# Patient Record
Sex: FEMALE | Race: WHITE | Employment: UNEMPLOYED | URBAN - METROPOLITAN AREA
[De-identification: names, ages, dates, MRNs, and addresses within clinical notes are randomized per-mention and may not be internally consistent; named-entity substitution may affect disease eponyms.]

---

## 2017-03-05 ENCOUNTER — APPOINTMENT (OUTPATIENT)
Dept: GENERAL RADIOLOGY | Age: 22
End: 2017-03-05
Payer: COMMERCIAL

## 2017-03-05 ENCOUNTER — HOSPITAL ENCOUNTER (EMERGENCY)
Age: 22
Discharge: HOME | End: 2017-03-05
Attending: FAMILY MEDICINE
Payer: COMMERCIAL

## 2017-03-05 VITALS
HEIGHT: 64 IN | DIASTOLIC BLOOD PRESSURE: 70 MMHG | BODY MASS INDEX: 18.78 KG/M2 | SYSTOLIC BLOOD PRESSURE: 131 MMHG | OXYGEN SATURATION: 100 % | RESPIRATION RATE: 16 BRPM | TEMPERATURE: 97.9 F | WEIGHT: 110 LBS | HEART RATE: 95 BPM

## 2017-03-05 DIAGNOSIS — M54.2 NECK PAIN, CHRONIC: Primary | ICD-10-CM

## 2017-03-05 DIAGNOSIS — G89.29 NECK PAIN, CHRONIC: Primary | ICD-10-CM

## 2017-03-05 LAB — PREGNANCY TEST URINE, POC: NORMAL

## 2017-03-05 PROCEDURE — 6360000002 HC RX W HCPCS: Performed by: FAMILY MEDICINE

## 2017-03-05 PROCEDURE — 96372 THER/PROPH/DIAG INJ SC/IM: CPT

## 2017-03-05 PROCEDURE — 72050 X-RAY EXAM NECK SPINE 4/5VWS: CPT

## 2017-03-05 PROCEDURE — 99283 EMERGENCY DEPT VISIT LOW MDM: CPT

## 2017-03-05 PROCEDURE — 6370000000 HC RX 637 (ALT 250 FOR IP): Performed by: FAMILY MEDICINE

## 2017-03-05 RX ORDER — HYDROCODONE BITARTRATE AND ACETAMINOPHEN 5; 325 MG/1; MG/1
1-2 TABLET ORAL EVERY 6 HOURS PRN
Qty: 20 TABLET | Refills: 0 | Status: SHIPPED | OUTPATIENT
Start: 2017-03-05 | End: 2017-03-12

## 2017-03-05 RX ORDER — KETOROLAC TROMETHAMINE 30 MG/ML
60 INJECTION, SOLUTION INTRAMUSCULAR; INTRAVENOUS ONCE
Status: COMPLETED | OUTPATIENT
Start: 2017-03-05 | End: 2017-03-05

## 2017-03-05 RX ORDER — DIAZEPAM 5 MG/ML
5 INJECTION, SOLUTION INTRAMUSCULAR; INTRAVENOUS ONCE
Status: DISCONTINUED | OUTPATIENT
Start: 2017-03-05 | End: 2017-03-05

## 2017-03-05 RX ORDER — HYDROCODONE BITARTRATE AND ACETAMINOPHEN 5; 325 MG/1; MG/1
1 TABLET ORAL ONCE
Status: COMPLETED | OUTPATIENT
Start: 2017-03-05 | End: 2017-03-05

## 2017-03-05 RX ORDER — ORPHENADRINE CITRATE 30 MG/ML
60 INJECTION INTRAMUSCULAR; INTRAVENOUS ONCE
Status: COMPLETED | OUTPATIENT
Start: 2017-03-05 | End: 2017-03-05

## 2017-03-05 RX ORDER — DIAZEPAM 5 MG/1
5 TABLET ORAL ONCE
Status: COMPLETED | OUTPATIENT
Start: 2017-03-05 | End: 2017-03-05

## 2017-03-05 RX ADMIN — DIAZEPAM 5 MG: 5 TABLET ORAL at 20:40

## 2017-03-05 RX ADMIN — ORPHENADRINE CITRATE 60 MG: 30 INJECTION INTRAMUSCULAR; INTRAVENOUS at 18:38

## 2017-03-05 RX ADMIN — KETOROLAC TROMETHAMINE 60 MG: 60 INJECTION, SOLUTION INTRAMUSCULAR at 18:39

## 2017-03-05 RX ADMIN — HYDROCODONE BITARTRATE AND ACETAMINOPHEN 1 TABLET: 5; 325 TABLET ORAL at 20:31

## 2017-03-05 ASSESSMENT — PAIN SCALES - GENERAL
PAINLEVEL_OUTOF10: 9

## 2017-03-05 ASSESSMENT — PAIN DESCRIPTION - PAIN TYPE: TYPE: ACUTE PAIN

## 2017-03-05 ASSESSMENT — PAIN DESCRIPTION - FREQUENCY: FREQUENCY: CONTINUOUS

## 2017-03-05 ASSESSMENT — PAIN DESCRIPTION - DESCRIPTORS: DESCRIPTORS: SHARP;ACHING

## 2017-03-05 ASSESSMENT — ENCOUNTER SYMPTOMS
PHOTOPHOBIA: 0
BOWEL INCONTINENCE: 0
VISUAL CHANGE: 0

## 2017-03-05 ASSESSMENT — PAIN DESCRIPTION - PROGRESSION: CLINICAL_PROGRESSION: GRADUALLY WORSENING

## 2017-03-05 ASSESSMENT — PAIN DESCRIPTION - LOCATION: LOCATION: BACK;NECK

## 2017-03-05 ASSESSMENT — PAIN DESCRIPTION - ONSET: ONSET: ON-GOING

## 2017-11-10 ENCOUNTER — HOSPITAL ENCOUNTER (EMERGENCY)
Age: 22
Discharge: HOME | End: 2017-11-10
Attending: EMERGENCY MEDICINE
Payer: COMMERCIAL

## 2017-11-10 VITALS
SYSTOLIC BLOOD PRESSURE: 111 MMHG | HEIGHT: 64 IN | DIASTOLIC BLOOD PRESSURE: 56 MMHG | OXYGEN SATURATION: 100 % | BODY MASS INDEX: 19.12 KG/M2 | TEMPERATURE: 98.1 F | HEART RATE: 75 BPM | RESPIRATION RATE: 14 BRPM | WEIGHT: 112 LBS

## 2017-11-10 DIAGNOSIS — K62.5 RECTAL BLEED: Primary | ICD-10-CM

## 2017-11-10 DIAGNOSIS — R10.9 CHRONIC ABDOMINAL PAIN: ICD-10-CM

## 2017-11-10 DIAGNOSIS — G89.29 CHRONIC ABDOMINAL PAIN: ICD-10-CM

## 2017-11-10 DIAGNOSIS — K60.2 RECTAL FISSURE: ICD-10-CM

## 2017-11-10 PROCEDURE — 99283 EMERGENCY DEPT VISIT LOW MDM: CPT

## 2017-11-10 ASSESSMENT — ENCOUNTER SYMPTOMS
FACIAL SWELLING: 0
CHEST TIGHTNESS: 0
DIARRHEA: 0
WHEEZING: 0
ABDOMINAL PAIN: 1
VOICE CHANGE: 0
CONSTIPATION: 0
SHORTNESS OF BREATH: 0
SINUS PRESSURE: 0
EYE DISCHARGE: 0
VOMITING: 0
SORE THROAT: 0
EYE PAIN: 0
EYE REDNESS: 0
CHOKING: 0
TROUBLE SWALLOWING: 0
ANAL BLEEDING: 1
STRIDOR: 0
COUGH: 0
RECTAL PAIN: 1
BACK PAIN: 0
BLOOD IN STOOL: 0

## 2017-11-10 ASSESSMENT — PAIN DESCRIPTION - ONSET: ONSET: ON-GOING

## 2017-11-10 ASSESSMENT — PAIN DESCRIPTION - FREQUENCY: FREQUENCY: INTERMITTENT

## 2017-11-10 ASSESSMENT — PAIN SCALES - GENERAL
PAINLEVEL_OUTOF10: 5
PAINLEVEL_OUTOF10: 3

## 2017-11-10 NOTE — ED TRIAGE NOTES
Patient in with rectal bleeding and low abdominal pain that she states has been ongoing for years she states she is afraid to go to the doctor to get a colonoscopy that was recommenced. Doctor did a rectal exam and there was no bleeding at this time.  vitals WNLS

## 2017-11-10 NOTE — ED PROVIDER NOTES
[address] [place] [address] ED  eMERGENCY dEPARTMENT eNCOUnter      Pt Name: Lilo Soliz  MRN: [id number]  [place] 1995  Date of evaluation: 11/10/2017  Provider: Javi Alexandra MD    [address] [place] [address]       Chief Complaint   Patient presents with    Rectal Bleeding     rectal bleeding x3 days. HISTORY OF PRESENT ILLNESS   (Location/Symptom, Timing/Onset, Context/Setting, Quality, Duration, Modifying Factors, Severity)  Note limiting factors. Lilo Soliz is a 23 y.o. female who presents to the emergency department Rectal pain and rectal bleeding bright red in color along with bowel movement, patient also complaining of left sided abdominal pain for years she was supposed to go for colonoscopy but at that time she was pregnant and that was 2 years ago but has not contacted any doctor for further investigation as per patient she is afraid to see doctors, patient comes to this emergency for 34 days of bright rectal bleeding along with all movement and becoming painful to rectally. There is no swelling did no hemorrhoid nor drainage no fever no chills.  1 para 1    HPI    Nursing Notes were reviewed. REVIEW OF SYSTEMS    (2-9 systems for level 4, 10 or more for level 5)     Review of Systems   Constitutional: Negative. Negative for activity change and fever. HENT: Negative for congestion, drooling, facial swelling, mouth sores, nosebleeds, sinus pressure, sore throat, trouble swallowing and voice change. Eyes: Negative for pain, discharge, redness and visual disturbance. Respiratory: Negative for cough, choking, chest tightness, shortness of breath, wheezing and stridor. Cardiovascular: Negative for chest pain, palpitations and leg swelling. Gastrointestinal: Positive for abdominal pain, anal bleeding and rectal pain. Negative for blood in stool, constipation, diarrhea and vomiting. Endocrine: Negative for cold intolerance, polyphagia and polyuria.    Genitourinary: Negative for dysuria, flank pain, frequency, genital sores and urgency. Musculoskeletal: Negative for back pain, joint swelling, neck pain and neck stiffness. Skin: Negative for pallor and rash. Neurological: Negative for tremors, seizures, syncope, weakness, numbness and headaches. Hematological: Negative for adenopathy. Does not bruise/bleed easily. Psychiatric/Behavioral: Negative for agitation, behavioral problems, hallucinations and sleep disturbance. The patient is not hyperactive. All other systems reviewed and are negative. Except as noted above the remainder of the review of systems was reviewed and negative. PAST MEDICAL HISTORY   History reviewed. No pertinent past medical history.       SURGICAL HISTORY       Past Surgical History:   Procedure Laterality Date    DOPPLER ECHOCARDIOGRAPHY  2013    TYMPANOSTOMY TUBE PLACEMENT           CURRENT MEDICATIONS       Previous Medications    FERROUS SULFATE 325 (65 FE) MG TABLET        LEVONORGESTREL (MIRENA) 20 MCG/24HR IUD    1 each by Intrauterine route once for 1 dose    SERTRALINE (ZOLOFT) 50 MG TABLET    Take 1 tablet by mouth daily       ALLERGIES     Amoxicillin    FAMILY HISTORY       Family History   Problem Relation Age of Onset    Cancer Maternal Grandfather      leukemia    Breast Cancer Neg Hx     Colon Cancer Neg Hx     Diabetes Neg Hx     Eclampsia Neg Hx     Hypertension Neg Hx     Ovarian Cancer Neg Hx      Labor Neg Hx     Spont Abortions Neg Hx     Stroke Neg Hx           SOCIAL HISTORY       Social History     Social History    Marital status: Single     Spouse name: N/A    Number of children: N/A    Years of education: N/A     Social History Main Topics    Smoking status: Never Smoker    Smokeless tobacco: Never Used    Alcohol use No    Drug use: No    Sexual activity: Yes     Partners: Male     Other Topics Concern    None     Social History Narrative    None       SCREENINGS             PHYSICAL normal range or not returned as of this dictation. EMERGENCY DEPARTMENT COURSE and DIFFERENTIAL DIAGNOSIS/MDM:   Vitals:    Vitals:    11/10/17 1720   BP: (!) 109/55   Pulse: 87   Resp: 14   Temp: 98.1 °F (36.7 °C)   TempSrc: Oral   SpO2: 98%   Weight: 112 lb (50.8 kg)   Height: 5' 4\" (1.626 m)           MDM    CRITICAL CARE TIME   Total Critical Care time was  minutes, excluding separately reportable procedures. There was a high probability of clinically significant/life threatening deterioration in the patient's condition which required my urgent intervention. ULTS:  None    PROCEDURES:  Unless otherwise noted below, none     Procedures    FINAL IMPRESSION      1. Rectal bleed    2. Rectal fissure    3.  Chronic abdominal pain          DISPOSITION/PLAN   DISPOSITION Decision to Discharge    PATIENT REFERRED TO:  Eder Bruno MD  [address] [place] [address]  [address]  [place]     In 1 week        DISCHARGE MEDICATIONS:  New Prescriptions    No medications on file          (Please note that portions of this note were completed with a voice recognition program.  Efforts were made to edit the dictations but occasionally words are mis-transcribed.)    Harry Shore MD (electronically signed)  Attending Emergency Physician       Harry Shore MD  11/10/17 [id number]

## 2018-10-26 ENCOUNTER — APPOINTMENT (OUTPATIENT)
Dept: GENERAL RADIOLOGY | Age: 23
End: 2018-10-26
Payer: COMMERCIAL

## 2018-10-26 ENCOUNTER — HOSPITAL ENCOUNTER (EMERGENCY)
Age: 23
Discharge: HOME | End: 2018-10-26
Payer: COMMERCIAL

## 2018-10-26 VITALS
WEIGHT: 120 LBS | HEART RATE: 71 BPM | OXYGEN SATURATION: 100 % | DIASTOLIC BLOOD PRESSURE: 77 MMHG | TEMPERATURE: 97.7 F | HEIGHT: 64 IN | SYSTOLIC BLOOD PRESSURE: 114 MMHG | RESPIRATION RATE: 14 BRPM | BODY MASS INDEX: 20.49 KG/M2

## 2018-10-26 DIAGNOSIS — E86.0 DEHYDRATION: Primary | ICD-10-CM

## 2018-10-26 DIAGNOSIS — R11.2 NON-INTRACTABLE VOMITING WITH NAUSEA, UNSPECIFIED VOMITING TYPE: ICD-10-CM

## 2018-10-26 DIAGNOSIS — R51.9 ACUTE NONINTRACTABLE HEADACHE, UNSPECIFIED HEADACHE TYPE: ICD-10-CM

## 2018-10-26 LAB
ALBUMIN SERPL-MCNC: 4.7 G/DL (ref 3.9–4.9)
ALP BLD-CCNC: 54 U/L (ref 40–130)
ALT SERPL-CCNC: 10 U/L (ref 0–33)
ANION GAP SERPL CALCULATED.3IONS-SCNC: 12 MEQ/L (ref 7–13)
AST SERPL-CCNC: 18 U/L (ref 0–35)
BACTERIA: NORMAL /HPF
BASOPHILS ABSOLUTE: 0.1 K/UL (ref 0–0.2)
BASOPHILS RELATIVE PERCENT: 0.6 %
BILIRUB SERPL-MCNC: 0.4 MG/DL (ref 0–1.2)
BILIRUBIN URINE: NEGATIVE
BLOOD, URINE: ABNORMAL
BUN BLDV-MCNC: 10 MG/DL (ref 6–20)
CALCIUM SERPL-MCNC: 9.6 MG/DL (ref 8.6–10.2)
CHLORIDE BLD-SCNC: 100 MEQ/L (ref 98–107)
CHP ED QC CHECK: YES
CLARITY: CLEAR
CO2: 27 MEQ/L (ref 22–29)
COLOR: YELLOW
CREAT SERPL-MCNC: 0.7 MG/DL (ref 0.5–0.9)
EOSINOPHILS ABSOLUTE: 0.1 K/UL (ref 0–0.7)
EOSINOPHILS RELATIVE PERCENT: 0.4 %
EPITHELIAL CELLS, UA: NORMAL /HPF
GFR AFRICAN AMERICAN: >60
GFR NON-AFRICAN AMERICAN: >60
GLOBULIN: 2.9 G/DL (ref 2.3–3.5)
GLUCOSE BLD-MCNC: 105 MG/DL (ref 74–109)
GLUCOSE URINE: NEGATIVE MG/DL
HCT VFR BLD CALC: 42.1 % (ref 37–47)
HEMOGLOBIN: 14.7 G/DL (ref 12–16)
KETONES, URINE: NEGATIVE MG/DL
LEUKOCYTE ESTERASE, URINE: NEGATIVE
LYMPHOCYTES ABSOLUTE: 2.3 K/UL (ref 1–4.8)
LYMPHOCYTES RELATIVE PERCENT: 17.1 %
MCH RBC QN AUTO: 31.8 PG (ref 27–31.3)
MCHC RBC AUTO-ENTMCNC: 34.8 % (ref 33–37)
MCV RBC AUTO: 91.5 FL (ref 82–100)
MONOCYTES ABSOLUTE: 0.7 K/UL (ref 0.2–0.8)
MONOCYTES RELATIVE PERCENT: 5.7 %
NEUTROPHILS ABSOLUTE: 10 K/UL (ref 1.4–6.5)
NEUTROPHILS RELATIVE PERCENT: 76.2 %
NITRITE, URINE: NEGATIVE
PDW BLD-RTO: 14.1 % (ref 11.5–14.5)
PH UA: 7 (ref 5–9)
PLATELET # BLD: 340 K/UL (ref 130–400)
POTASSIUM SERPL-SCNC: 3.9 MEQ/L (ref 3.5–5.1)
PREGNANCY TEST URINE, POC: NEGATIVE
PROTEIN UA: NEGATIVE MG/DL
RAPID INFLUENZA  B AGN: NEGATIVE
RAPID INFLUENZA A AGN: NEGATIVE
RBC # BLD: 4.6 M/UL (ref 4.2–5.4)
RBC UA: NORMAL /HPF (ref 0–2)
SODIUM BLD-SCNC: 139 MEQ/L (ref 132–144)
SPECIFIC GRAVITY UA: 1.02 (ref 1–1.03)
TOTAL PROTEIN: 7.6 G/DL (ref 6.4–8.1)
URINE REFLEX TO CULTURE: YES
UROBILINOGEN, URINE: 1 E.U./DL
WBC # BLD: 13.2 K/UL (ref 4.8–10.8)
WBC UA: NORMAL /HPF (ref 0–5)

## 2018-10-26 PROCEDURE — 2580000003 HC RX 258: Performed by: PHYSICIAN ASSISTANT

## 2018-10-26 PROCEDURE — 85025 COMPLETE CBC W/AUTO DIFF WBC: CPT

## 2018-10-26 PROCEDURE — 87804 INFLUENZA ASSAY W/OPTIC: CPT

## 2018-10-26 PROCEDURE — 71046 X-RAY EXAM CHEST 2 VIEWS: CPT

## 2018-10-26 PROCEDURE — 80053 COMPREHEN METABOLIC PANEL: CPT

## 2018-10-26 PROCEDURE — 6360000002 HC RX W HCPCS: Performed by: PHYSICIAN ASSISTANT

## 2018-10-26 PROCEDURE — 87086 URINE CULTURE/COLONY COUNT: CPT

## 2018-10-26 PROCEDURE — 96374 THER/PROPH/DIAG INJ IV PUSH: CPT

## 2018-10-26 PROCEDURE — 36415 COLL VENOUS BLD VENIPUNCTURE: CPT

## 2018-10-26 PROCEDURE — 99284 EMERGENCY DEPT VISIT MOD MDM: CPT

## 2018-10-26 PROCEDURE — 6370000000 HC RX 637 (ALT 250 FOR IP): Performed by: PHYSICIAN ASSISTANT

## 2018-10-26 PROCEDURE — 96375 TX/PRO/DX INJ NEW DRUG ADDON: CPT

## 2018-10-26 PROCEDURE — 81001 URINALYSIS AUTO W/SCOPE: CPT

## 2018-10-26 PROCEDURE — 96361 HYDRATE IV INFUSION ADD-ON: CPT

## 2018-10-26 RX ORDER — SULFAMETHOXAZOLE AND TRIMETHOPRIM 800; 160 MG/1; MG/1
1 TABLET ORAL ONCE
Status: COMPLETED | OUTPATIENT
Start: 2018-10-26 | End: 2018-10-26

## 2018-10-26 RX ORDER — ONDANSETRON 2 MG/ML
4 INJECTION INTRAMUSCULAR; INTRAVENOUS ONCE
Status: COMPLETED | OUTPATIENT
Start: 2018-10-26 | End: 2018-10-26

## 2018-10-26 RX ORDER — ONDANSETRON 4 MG/1
4 TABLET, ORALLY DISINTEGRATING ORAL EVERY 8 HOURS PRN
Qty: 20 TABLET | Refills: 0 | Status: SHIPPED | OUTPATIENT
Start: 2018-10-26 | End: 2019-02-12 | Stop reason: SDUPTHER

## 2018-10-26 RX ORDER — KETOROLAC TROMETHAMINE 30 MG/ML
30 INJECTION, SOLUTION INTRAMUSCULAR; INTRAVENOUS ONCE
Status: COMPLETED | OUTPATIENT
Start: 2018-10-26 | End: 2018-10-26

## 2018-10-26 RX ORDER — BUTALBITAL, ACETAMINOPHEN AND CAFFEINE 300; 40; 50 MG/1; MG/1; MG/1
1 CAPSULE ORAL EVERY 4 HOURS PRN
Qty: 20 CAPSULE | Refills: 0 | Status: SHIPPED | OUTPATIENT
Start: 2018-10-26 | End: 2019-02-12

## 2018-10-26 RX ORDER — 0.9 % SODIUM CHLORIDE 0.9 %
1000 INTRAVENOUS SOLUTION INTRAVENOUS ONCE
Status: COMPLETED | OUTPATIENT
Start: 2018-10-26 | End: 2018-10-26

## 2018-10-26 RX ORDER — HYDROXYZINE PAMOATE 25 MG/1
50 CAPSULE ORAL ONCE
Status: COMPLETED | OUTPATIENT
Start: 2018-10-26 | End: 2018-10-26

## 2018-10-26 RX ORDER — METOCLOPRAMIDE HYDROCHLORIDE 5 MG/ML
10 INJECTION INTRAMUSCULAR; INTRAVENOUS ONCE
Status: COMPLETED | OUTPATIENT
Start: 2018-10-26 | End: 2018-10-26

## 2018-10-26 RX ORDER — MORPHINE SULFATE 4 MG/ML
1 INJECTION, SOLUTION INTRAMUSCULAR; INTRAVENOUS ONCE
Status: COMPLETED | OUTPATIENT
Start: 2018-10-26 | End: 2018-10-26

## 2018-10-26 RX ORDER — SULFAMETHOXAZOLE AND TRIMETHOPRIM 800; 160 MG/1; MG/1
1 TABLET ORAL 2 TIMES DAILY
Qty: 20 TABLET | Refills: 0 | Status: SHIPPED | OUTPATIENT
Start: 2018-10-26 | End: 2018-11-05

## 2018-10-26 RX ADMIN — HYDROXYZINE PAMOATE 50 MG: 25 CAPSULE ORAL at 16:26

## 2018-10-26 RX ADMIN — METOCLOPRAMIDE 10 MG: 5 INJECTION, SOLUTION INTRAMUSCULAR; INTRAVENOUS at 16:26

## 2018-10-26 RX ADMIN — MORPHINE SULFATE 1 MG: 4 INJECTION, SOLUTION INTRAMUSCULAR; INTRAVENOUS at 17:13

## 2018-10-26 RX ADMIN — SULFAMETHOXAZOLE AND TRIMETHOPRIM 1 TABLET: 800; 160 TABLET ORAL at 16:26

## 2018-10-26 RX ADMIN — SODIUM CHLORIDE 1000 ML: 9 INJECTION, SOLUTION INTRAVENOUS at 16:25

## 2018-10-26 RX ADMIN — SODIUM CHLORIDE 1000 ML: 9 INJECTION, SOLUTION INTRAVENOUS at 15:00

## 2018-10-26 RX ADMIN — ONDANSETRON HYDROCHLORIDE 4 MG: 2 SOLUTION INTRAMUSCULAR; INTRAVENOUS at 15:00

## 2018-10-26 RX ADMIN — KETOROLAC TROMETHAMINE 30 MG: 30 INJECTION, SOLUTION INTRAMUSCULAR at 15:01

## 2018-10-26 ASSESSMENT — ENCOUNTER SYMPTOMS
COLOR CHANGE: 0
NAUSEA: 1
VOMITING: 1
EYE PAIN: 0
ALLERGIC/IMMUNOLOGIC NEGATIVE: 1
SINUS PAIN: 0
TROUBLE SWALLOWING: 0
APNEA: 0
ABDOMINAL PAIN: 0
SHORTNESS OF BREATH: 0
COUGH: 0

## 2018-10-26 ASSESSMENT — PAIN DESCRIPTION - LOCATION
LOCATION: HEAD

## 2018-10-26 ASSESSMENT — PAIN DESCRIPTION - DESCRIPTORS
DESCRIPTORS: ACHING
DESCRIPTORS: POUNDING

## 2018-10-26 ASSESSMENT — PAIN DESCRIPTION - PAIN TYPE
TYPE: ACUTE PAIN
TYPE: ACUTE PAIN

## 2018-10-26 ASSESSMENT — PAIN SCALES - GENERAL
PAINLEVEL_OUTOF10: 10
PAINLEVEL_OUTOF10: 9
PAINLEVEL_OUTOF10: 10
PAINLEVEL_OUTOF10: 9
PAINLEVEL_OUTOF10: 9

## 2018-10-26 ASSESSMENT — PAIN DESCRIPTION - ONSET: ONSET: ON-GOING

## 2018-10-26 ASSESSMENT — PAIN DESCRIPTION - FREQUENCY: FREQUENCY: CONTINUOUS

## 2018-10-26 ASSESSMENT — PAIN DESCRIPTION - ORIENTATION: ORIENTATION: UPPER

## 2018-10-26 NOTE — ED NOTES
D/C instructions and rx's x 2 given. Pt and boyfriend verbalized understanding. Boyfriend states he is driving her home. Pt laughing and joking at times. Denies any change in pain. Ambulated out with him with steady gait.      Lila Carter RN  10/26/18 1216 [facility], RN  10/26/18 6421

## 2018-10-26 NOTE — ED NOTES
Had to wake patient up to medicate. States very slight change in headache. States pain may be a 9/10 at this time. Pt appears very comfortable. Visitor still asleep in bed with patient. Woke him up. Informed they are going home. He states he is driving patient home.      Lori Bill RN  10/26/18 7611

## 2018-10-28 LAB — URINE CULTURE, ROUTINE: NORMAL

## 2019-02-12 ENCOUNTER — OFFICE VISIT (OUTPATIENT)
Dept: OBGYN CLINIC | Age: 24
End: 2019-02-12
Payer: COMMERCIAL

## 2019-02-12 VITALS
DIASTOLIC BLOOD PRESSURE: 70 MMHG | WEIGHT: 125.1 LBS | BODY MASS INDEX: 21.36 KG/M2 | HEIGHT: 64 IN | SYSTOLIC BLOOD PRESSURE: 102 MMHG

## 2019-02-12 DIAGNOSIS — Z32.01 PREGNANCY TEST POSITIVE: Primary | ICD-10-CM

## 2019-02-12 DIAGNOSIS — Z32.01 PREGNANCY TEST POSITIVE: ICD-10-CM

## 2019-02-12 DIAGNOSIS — Z32.01 POSITIVE URINE PREGNANCY TEST: ICD-10-CM

## 2019-02-12 LAB
BILIRUBIN URINE: NEGATIVE
BLOOD, URINE: NEGATIVE
CLARITY: CLEAR
COLOR: YELLOW
GLUCOSE URINE: NEGATIVE MG/DL
GONADOTROPIN, CHORIONIC (HCG) QUANT: NORMAL MIU/ML
HCG, URINE, POC: POSITIVE
KETONES, URINE: NEGATIVE MG/DL
LEUKOCYTE ESTERASE, URINE: NEGATIVE
Lab: NORMAL
NEGATIVE QC PASS/FAIL: NORMAL
NITRITE, URINE: NEGATIVE
PH UA: >=9 (ref 5–9)
POSITIVE QC PASS/FAIL: NORMAL
PROTEIN UA: NEGATIVE MG/DL
SPECIFIC GRAVITY UA: 1.02 (ref 1–1.03)
UROBILINOGEN, URINE: 0.2 E.U./DL

## 2019-02-12 PROCEDURE — G8428 CUR MEDS NOT DOCUMENT: HCPCS | Performed by: OBSTETRICS & GYNECOLOGY

## 2019-02-12 PROCEDURE — 1036F TOBACCO NON-USER: CPT | Performed by: OBSTETRICS & GYNECOLOGY

## 2019-02-12 PROCEDURE — 81025 URINE PREGNANCY TEST: CPT | Performed by: OBSTETRICS & GYNECOLOGY

## 2019-02-12 PROCEDURE — G8420 CALC BMI NORM PARAMETERS: HCPCS | Performed by: OBSTETRICS & GYNECOLOGY

## 2019-02-12 PROCEDURE — 99213 OFFICE O/P EST LOW 20 MIN: CPT | Performed by: OBSTETRICS & GYNECOLOGY

## 2019-02-12 PROCEDURE — G8484 FLU IMMUNIZE NO ADMIN: HCPCS | Performed by: OBSTETRICS & GYNECOLOGY

## 2019-02-12 RX ORDER — ONDANSETRON 4 MG/1
4 TABLET, ORALLY DISINTEGRATING ORAL EVERY 8 HOURS PRN
Qty: 30 TABLET | Refills: 1 | Status: SHIPPED | OUTPATIENT
Start: 2019-02-12 | End: 2019-05-13

## 2019-02-14 ENCOUNTER — HOSPITAL ENCOUNTER (OUTPATIENT)
Dept: ULTRASOUND IMAGING | Age: 24
Discharge: HOME | End: 2019-02-16
Payer: COMMERCIAL

## 2019-02-14 DIAGNOSIS — Z32.01 PREGNANCY TEST POSITIVE: ICD-10-CM

## 2019-02-14 LAB — URINE CULTURE, ROUTINE: NORMAL

## 2019-02-14 PROCEDURE — 76801 OB US < 14 WKS SINGLE FETUS: CPT

## 2019-02-15 LAB
6-ACETYLMORPHINE: NOT DETECTED
7-AMINOCLONAZEPAM: NOT DETECTED
ALPHA-OH-ALPRAZOLAM: NOT DETECTED
ALPRAZOLAM: NOT DETECTED
AMPHETAMINE: NOT DETECTED
BARBITURATES: NOT DETECTED
BENZOYLECGONINE: NOT DETECTED
BUPRENORPHINE: NOT DETECTED
CARISOPRODOL: NOT DETECTED
CLONAZEPAM: NOT DETECTED
CODEINE: NOT DETECTED
CREATININE URINE: 94.1 MG/DL (ref 20–400)
DIAZEPAM: NOT DETECTED
EER PAIN MGT DRUG PANEL, HIGH RES/EMIT U: NORMAL
ETHYL GLUCURONIDE: PRESENT
FENTANYL: NOT DETECTED
HYDROCODONE: NOT DETECTED
HYDROMORPHONE: NOT DETECTED
LORAZEPAM: NOT DETECTED
MARIJUANA METABOLITE: NOT DETECTED
MDA: NOT DETECTED
MDEA: NOT DETECTED
MDMA URINE: NOT DETECTED
MEPERIDINE: NOT DETECTED
METHADONE: NOT DETECTED
METHAMPHETAMINE: NOT DETECTED
METHYLPHENIDATE: NOT DETECTED
MIDAZOLAM: NOT DETECTED
MORPHINE: NOT DETECTED
NORBUPRENORPHINE, FREE: NOT DETECTED
NORDIAZEPAM: NOT DETECTED
NORFENTANYL: NOT DETECTED
NORHYDROCODONE, URINE: NOT DETECTED
NOROXYCODONE: PRESENT
NOROXYMORPHONE, URINE: PRESENT
OXAZEPAM: NOT DETECTED
OXYCODONE: NOT DETECTED
OXYMORPHONE: NOT DETECTED
PAIN MANAGEMENT DRUG PANEL: NORMAL
PCP: NOT DETECTED
PHENTERMINE: NOT DETECTED
PROPOXYPHENE: NOT DETECTED
TAPENTADOL, URINE: NOT DETECTED
TAPENTADOL-O-SULFATE, URINE: NOT DETECTED
TEMAZEPAM: NOT DETECTED
TRAMADOL: NOT DETECTED
ZOLPIDEM: NOT DETECTED

## 2019-05-13 ENCOUNTER — INITIAL PRENATAL (OUTPATIENT)
Dept: OBGYN CLINIC | Age: 24
End: 2019-05-13
Payer: COMMERCIAL

## 2019-05-13 VITALS
BODY MASS INDEX: 23.86 KG/M2 | WEIGHT: 139 LBS | SYSTOLIC BLOOD PRESSURE: 100 MMHG | HEART RATE: 82 BPM | DIASTOLIC BLOOD PRESSURE: 70 MMHG

## 2019-05-13 DIAGNOSIS — Z3A.22 22 WEEKS GESTATION OF PREGNANCY: ICD-10-CM

## 2019-05-13 DIAGNOSIS — Z34.80 SUPERVISION OF OTHER NORMAL PREGNANCY, ANTEPARTUM: ICD-10-CM

## 2019-05-13 DIAGNOSIS — Z34.80 SUPERVISION OF OTHER NORMAL PREGNANCY, ANTEPARTUM: Primary | ICD-10-CM

## 2019-05-13 LAB
BASOPHILS ABSOLUTE: 0 K/UL (ref 0–0.2)
BASOPHILS RELATIVE PERCENT: 0.4 %
EOSINOPHILS ABSOLUTE: 0.1 K/UL (ref 0–0.7)
EOSINOPHILS RELATIVE PERCENT: 0.7 %
HCT VFR BLD CALC: 34 % (ref 37–47)
HEMOGLOBIN: 11.5 G/DL (ref 12–16)
HEPATITIS B SURFACE ANTIGEN INTERPRETATION: NORMAL
LYMPHOCYTES ABSOLUTE: 1.7 K/UL (ref 1–4.8)
LYMPHOCYTES RELATIVE PERCENT: 16.4 %
MCH RBC QN AUTO: 31.5 PG (ref 27–31.3)
MCHC RBC AUTO-ENTMCNC: 33.7 % (ref 33–37)
MCV RBC AUTO: 93.5 FL (ref 82–100)
MONOCYTES ABSOLUTE: 0.7 K/UL (ref 0.2–0.8)
MONOCYTES RELATIVE PERCENT: 6.7 %
NEUTROPHILS ABSOLUTE: 7.8 K/UL (ref 1.4–6.5)
NEUTROPHILS RELATIVE PERCENT: 75.8 %
PDW BLD-RTO: 13.4 % (ref 11.5–14.5)
PLATELET # BLD: 243 K/UL (ref 130–400)
RBC # BLD: 3.64 M/UL (ref 4.2–5.4)
RUBELLA ANTIBODY IGG: 22.1 IU/ML
WBC # BLD: 10.3 K/UL (ref 4.8–10.8)

## 2019-05-13 PROCEDURE — 99214 OFFICE O/P EST MOD 30 MIN: CPT | Performed by: OBSTETRICS & GYNECOLOGY

## 2019-05-13 NOTE — PROGRESS NOTES
SUBJECTIVE:   30 y.o.  female here for amenorrhea and new ob. Pt denies any VB and is tolerating po daily. Pt taking PNV. PT with h/o TSVD x 1. PT with issues with insurance and pt will f/u for Medicaid evaluation this week. PT with h/o TSVD x 1. Review of Systems:  General ROS: negative  Psychological ROS: negative  ENT ROS: negative  Endocrine ROS: negative  Respiratory ROS: no cough, shortness of breath, or wheezing  Cardiovascular ROS: no chest pain or dyspnea on exertion  Gastrointestinal ROS: no abdominal pain, change in bowel habits, or black or bloody stools  Genito-Urinary ROS: no dysuria, trouble voiding, or hematuria  Musculoskeletal ROS: negative  Neurological ROS: no TIA or stroke symptoms  Dermatological ROS: negative    OBJECTIVE:   /70   Pulse 82   Wt 139 lb (63 kg)   LMP 2018 (Approximate)   BMI 23.86 kg/m²     Physical Exam:  GEN: She appears well, afebrile. HEENT: normal cephalic, atraumatic  CVS: regular rate and rhythm  ABDOMEN: benign, soft, nontender, no masses. MUSCULOSKELETAL: normal gait, no masses  SKIN: normal texture and tone, no lesions  NEURO: normal tone, no hyperreflexia, 1+DTRs throughout    Pelvic Exam:   EFG: normal external genitalia  URETHRA: normal appearing without diverticula or lesions  VULVA: normal appearing vulva with no masses, tenderness or lesions  VAGINA: normal rugae, no discharge   CERVIX: parous, no lesions  UTERUS: uterus is normal shape, consistency and nontender - 22wks  ADNEXA: normal adnexa in size, nontender and no masses. PERINEUM: normal appearing without lesions or masses  ANUS: normal appearing without lesions or masses, no fissures or hemorrhoids    ASSESSMENT:   Supervision of pregnancy    PLAN:   Pap with GC/Chlam today  PN labs and US pending   1hr at next apt  Past medical, social and family history reviewed and updated in pt's chart.

## 2019-05-14 LAB
ABO/RH: NORMAL
ANTIBODY SCREEN: NORMAL
CLUE CELLS: NORMAL
RPR: NORMAL
TRICHOMONAS PREP: NORMAL
TRICHOMONAS VAGINALIS SCREEN: NEGATIVE
YEAST WET PREP: NORMAL

## 2019-05-15 LAB
HIV 1,2 COMBO ANTIGEN/ANTIBODY: NEGATIVE
VZV IGG SER QL IA: 2774 IV

## 2019-05-16 LAB
AFP INTERPRETATION: NORMAL
AFP MOM: 1.32
AFP SPECIMEN: NORMAL
C TRACH DNA GENITAL QL NAA+PROBE: NEGATIVE
DATING: NORMAL
ESTIMATED DUE DATE: NORMAL
FETUS COUNT: NORMAL
GESTATIONAL AGE CALC AT COLLECT: NORMAL
HISTORY/NEURAL TUBE DEFECTS: NO
INSULIN DEP. DIABETIC: NO
MATERNAL AGE AT EDD: 26.4 YR
MATERNAL WEIGHT: NORMAL
N. GONORRHOEAE DNA: NEGATIVE
PT AFP: 117 NG/ML
RACE: NORMAL
SMOKING: NO

## 2019-05-17 LAB
HPV COMMENT: NORMAL
HPV TYPE 16: NOT DETECTED
HPV TYPE 18: NOT DETECTED
HPVOH (OTHER TYPES): NOT DETECTED

## 2019-05-20 LAB
EER NON INVASIVE PRENATAL ANEUPLOIDY: NORMAL
FETAL FRACTION: 23.3
FETAL GENDER: NORMAL
GESTATIONAL AGE (DAYS): 3
GESTATIONAL AGE(WEEKS): 22
Lab: NORMAL
MATERNAL WEIGHT: 139
MONOSOMY X: NORMAL
REPORT FETUS GENDER: YES
TRIPLOIDY (VANISHING TWIN): NORMAL
TRISOMY 13 RISK: NORMAL
TRISOMY 18 RISK ASSESSMENT: NORMAL
TRISOMY 21 RISK: NORMAL

## 2019-05-23 ENCOUNTER — HOSPITAL ENCOUNTER (OUTPATIENT)
Dept: ULTRASOUND IMAGING | Age: 24
Discharge: HOME | End: 2019-05-25
Payer: COMMERCIAL

## 2019-05-23 DIAGNOSIS — Z34.80 SUPERVISION OF OTHER NORMAL PREGNANCY, ANTEPARTUM: ICD-10-CM

## 2019-05-23 DIAGNOSIS — Z3A.22 22 WEEKS GESTATION OF PREGNANCY: ICD-10-CM

## 2019-05-23 PROCEDURE — 76805 OB US >/= 14 WKS SNGL FETUS: CPT

## 2019-06-24 DIAGNOSIS — Z34.92 NORMAL PREGNANCY IN SECOND TRIMESTER: Primary | ICD-10-CM

## 2019-06-25 DIAGNOSIS — Z34.92 NORMAL PREGNANCY IN SECOND TRIMESTER: ICD-10-CM

## 2019-06-25 LAB
GLUCOSE, 1HR PP: 82 MG/DL (ref 60–140)
HCT VFR BLD CALC: 29.4 % (ref 37–47)
HEMOGLOBIN: 10.1 G/DL (ref 12–16)

## 2019-07-01 RX ORDER — FERROUS SULFATE 325(65) MG
325 TABLET ORAL 2 TIMES DAILY
Qty: 90 TABLET | Refills: 3 | Status: SHIPPED | OUTPATIENT
Start: 2019-07-01

## 2019-09-09 ENCOUNTER — ROUTINE PRENATAL (OUTPATIENT)
Dept: OBGYN CLINIC | Age: 24
End: 2019-09-09
Payer: COMMERCIAL

## 2019-09-09 ENCOUNTER — PREP FOR PROCEDURE (OUTPATIENT)
Dept: OBGYN | Age: 24
End: 2019-09-09

## 2019-09-09 VITALS
BODY MASS INDEX: 26.61 KG/M2 | HEART RATE: 92 BPM | SYSTOLIC BLOOD PRESSURE: 120 MMHG | DIASTOLIC BLOOD PRESSURE: 78 MMHG | WEIGHT: 155 LBS

## 2019-09-09 DIAGNOSIS — Z34.83 PRENATAL CARE, SUBSEQUENT PREGNANCY, THIRD TRIMESTER: Primary | ICD-10-CM

## 2019-09-09 DIAGNOSIS — Z34.90 TERM PREGNANCY: ICD-10-CM

## 2019-09-09 DIAGNOSIS — Z34.83 PRENATAL CARE, SUBSEQUENT PREGNANCY, THIRD TRIMESTER: ICD-10-CM

## 2019-09-09 DIAGNOSIS — Z3A.39 39 WEEKS GESTATION OF PREGNANCY: ICD-10-CM

## 2019-09-09 PROCEDURE — 1036F TOBACCO NON-USER: CPT | Performed by: OBSTETRICS & GYNECOLOGY

## 2019-09-09 PROCEDURE — G8428 CUR MEDS NOT DOCUMENT: HCPCS | Performed by: OBSTETRICS & GYNECOLOGY

## 2019-09-09 PROCEDURE — 99213 OFFICE O/P EST LOW 20 MIN: CPT | Performed by: OBSTETRICS & GYNECOLOGY

## 2019-09-09 PROCEDURE — G8419 CALC BMI OUT NRM PARAM NOF/U: HCPCS | Performed by: OBSTETRICS & GYNECOLOGY

## 2019-09-09 RX ORDER — ONDANSETRON 2 MG/ML
4 INJECTION INTRAMUSCULAR; INTRAVENOUS EVERY 6 HOURS PRN
Status: CANCELLED | OUTPATIENT
Start: 2019-09-09

## 2019-09-09 RX ORDER — SODIUM CHLORIDE, SODIUM LACTATE, POTASSIUM CHLORIDE, CALCIUM CHLORIDE 600; 310; 30; 20 MG/100ML; MG/100ML; MG/100ML; MG/100ML
INJECTION, SOLUTION INTRAVENOUS CONTINUOUS
Status: CANCELLED | OUTPATIENT
Start: 2019-09-09

## 2019-09-09 RX ORDER — ACETAMINOPHEN 325 MG/1
650 TABLET ORAL EVERY 4 HOURS PRN
Status: CANCELLED | OUTPATIENT
Start: 2019-09-09

## 2019-09-09 RX ORDER — DOCUSATE SODIUM 100 MG/1
100 CAPSULE, LIQUID FILLED ORAL 2 TIMES DAILY PRN
Status: CANCELLED | OUTPATIENT
Start: 2019-09-09

## 2019-09-09 RX ORDER — NALBUPHINE HCL 10 MG/ML
10 AMPUL (ML) INJECTION
Status: CANCELLED | OUTPATIENT
Start: 2019-09-09 | End: 2019-09-11

## 2019-09-09 RX ORDER — DIPHENHYDRAMINE HCL 25 MG
25 TABLET ORAL EVERY 4 HOURS PRN
Status: CANCELLED | OUTPATIENT
Start: 2019-09-09

## 2019-09-09 RX ORDER — SODIUM CHLORIDE 0.9 % (FLUSH) 0.9 %
10 SYRINGE (ML) INJECTION PRN
Status: CANCELLED | OUTPATIENT
Start: 2019-09-09

## 2019-09-09 RX ORDER — SODIUM CHLORIDE 0.9 % (FLUSH) 0.9 %
10 SYRINGE (ML) INJECTION EVERY 12 HOURS SCHEDULED
Status: CANCELLED | OUTPATIENT
Start: 2019-09-09

## 2019-09-09 NOTE — PROGRESS NOTES
Patient's last menstrual period was 11/26/2018 (approximate).   Please reference prenatal and OB flow chart for further information  PT here today for routine prenatal care  Pt endorses fetal movement and denies loss of fluid, contractions or vaginal bleeding  Pt without complaints  ROS:  Pt denies headache, dysuria, nausea/vomiting  PE:  /78   Pulse 92   Wt 155 lb (70.3 kg)   LMP 11/26/2018 (Approximate)   BMI 26.61 kg/m²   Gen - Alert and oriented x 3  HEENT- NC/AT, CVS - RRR, Lungs - CTAB  Abd - FH 39  Appropriate fetal growth  H/o TSVD x 1 and limited prenatal care  GBS pending  Cx: 1/50/-2  PT interested in induction - pt planned for induction 9/10/19 @ 8pm

## 2019-09-10 ENCOUNTER — HOSPITAL ENCOUNTER (INPATIENT)
Age: 24
LOS: 3 days | Discharge: HOME | DRG: 560 | End: 2019-09-13
Attending: OBSTETRICS & GYNECOLOGY | Admitting: OBSTETRICS & GYNECOLOGY
Payer: COMMERCIAL

## 2019-09-10 PROBLEM — Z37.9 NORMAL LABOR: Status: ACTIVE | Noted: 2019-09-10

## 2019-09-10 LAB
ABO/RH: NORMAL
ALBUMIN SERPL-MCNC: 3.4 G/DL (ref 3.5–4.6)
ALP BLD-CCNC: 113 U/L (ref 40–130)
ALT SERPL-CCNC: 6 U/L (ref 0–33)
AMPHETAMINE SCREEN, URINE: ABNORMAL
ANION GAP SERPL CALCULATED.3IONS-SCNC: 12 MEQ/L (ref 9–15)
ANTIBODY SCREEN: NORMAL
AST SERPL-CCNC: 17 U/L (ref 0–35)
BACTERIA: ABNORMAL /HPF
BARBITURATE SCREEN URINE: ABNORMAL
BASOPHILS ABSOLUTE: 0.1 K/UL (ref 0–0.2)
BASOPHILS RELATIVE PERCENT: 0.7 %
BENZODIAZEPINE SCREEN, URINE: ABNORMAL
BILIRUB SERPL-MCNC: <0.2 MG/DL (ref 0.2–0.7)
BILIRUBIN URINE: NEGATIVE
BLOOD, URINE: NEGATIVE
BUN BLDV-MCNC: 7 MG/DL (ref 6–20)
CALCIUM SERPL-MCNC: 9.6 MG/DL (ref 8.5–9.9)
CANNABINOID SCREEN URINE: ABNORMAL
CHLORIDE BLD-SCNC: 105 MEQ/L (ref 95–107)
CLARITY: CLEAR
CO2: 22 MEQ/L (ref 20–31)
COCAINE METABOLITE SCREEN URINE: ABNORMAL
COLOR: YELLOW
CREAT SERPL-MCNC: 0.55 MG/DL (ref 0.5–0.9)
EOSINOPHILS ABSOLUTE: 0.1 K/UL (ref 0–0.7)
EOSINOPHILS RELATIVE PERCENT: 0.7 %
EPITHELIAL CELLS, UA: ABNORMAL /HPF (ref 0–5)
GFR AFRICAN AMERICAN: >60
GFR NON-AFRICAN AMERICAN: >60
GLOBULIN: 3.3 G/DL (ref 2.3–3.5)
GLUCOSE BLD-MCNC: 85 MG/DL (ref 70–99)
GLUCOSE URINE: NEGATIVE MG/DL
HCT VFR BLD CALC: 27.9 % (ref 37–47)
HEMOGLOBIN: 9.1 G/DL (ref 12–16)
HEPATITIS B SURFACE ANTIGEN INTERPRETATION: NORMAL
HYALINE CASTS: ABNORMAL /HPF (ref 0–5)
KETONES, URINE: NEGATIVE MG/DL
LEUKOCYTE ESTERASE, URINE: ABNORMAL
LYMPHOCYTES ABSOLUTE: 2.4 K/UL (ref 1–4.8)
LYMPHOCYTES RELATIVE PERCENT: 22.3 %
Lab: ABNORMAL
MCH RBC QN AUTO: 27.1 PG (ref 27–31.3)
MCHC RBC AUTO-ENTMCNC: 32.6 % (ref 33–37)
MCV RBC AUTO: 83.2 FL (ref 82–100)
METHADONE SCREEN, URINE: ABNORMAL
MONOCYTES ABSOLUTE: 1 K/UL (ref 0.2–0.8)
MONOCYTES RELATIVE PERCENT: 9.6 %
NEUTROPHILS ABSOLUTE: 7.2 K/UL (ref 1.4–6.5)
NEUTROPHILS RELATIVE PERCENT: 66.7 %
NITRITE, URINE: NEGATIVE
OPIATE SCREEN URINE: ABNORMAL
OXYCODONE URINE: POSITIVE
PDW BLD-RTO: 14.5 % (ref 11.5–14.5)
PH UA: 7.5 (ref 5–9)
PHENCYCLIDINE SCREEN URINE: ABNORMAL
PLATELET # BLD: 242 K/UL (ref 130–400)
POTASSIUM SERPL-SCNC: 4 MEQ/L (ref 3.4–4.9)
PROPOXYPHENE SCREEN: ABNORMAL
PROTEIN UA: NEGATIVE MG/DL
RBC # BLD: 3.35 M/UL (ref 4.2–5.4)
RBC UA: ABNORMAL /HPF (ref 0–5)
SODIUM BLD-SCNC: 139 MEQ/L (ref 135–144)
SPECIFIC GRAVITY UA: 1.01 (ref 1–1.03)
TOTAL PROTEIN: 6.7 G/DL (ref 6.3–8)
UROBILINOGEN, URINE: 0.2 E.U./DL
WBC # BLD: 10.8 K/UL (ref 4.8–10.8)
WBC UA: ABNORMAL /HPF (ref 0–5)

## 2019-09-10 PROCEDURE — 86762 RUBELLA ANTIBODY: CPT

## 2019-09-10 PROCEDURE — 2580000003 HC RX 258: Performed by: OBSTETRICS & GYNECOLOGY

## 2019-09-10 PROCEDURE — 86900 BLOOD TYPING SEROLOGIC ABO: CPT

## 2019-09-10 PROCEDURE — 36415 COLL VENOUS BLD VENIPUNCTURE: CPT

## 2019-09-10 PROCEDURE — 86850 RBC ANTIBODY SCREEN: CPT

## 2019-09-10 PROCEDURE — 81001 URINALYSIS AUTO W/SCOPE: CPT

## 2019-09-10 PROCEDURE — 80053 COMPREHEN METABOLIC PANEL: CPT

## 2019-09-10 PROCEDURE — 80307 DRUG TEST PRSMV CHEM ANLYZR: CPT

## 2019-09-10 PROCEDURE — 85025 COMPLETE CBC W/AUTO DIFF WBC: CPT

## 2019-09-10 PROCEDURE — 86901 BLOOD TYPING SEROLOGIC RH(D): CPT

## 2019-09-10 PROCEDURE — 1220000000 HC SEMI PRIVATE OB R&B

## 2019-09-10 PROCEDURE — 87340 HEPATITIS B SURFACE AG IA: CPT

## 2019-09-10 PROCEDURE — 6370000000 HC RX 637 (ALT 250 FOR IP): Performed by: OBSTETRICS & GYNECOLOGY

## 2019-09-10 RX ORDER — SODIUM CHLORIDE 0.9 % (FLUSH) 0.9 %
10 SYRINGE (ML) INJECTION PRN
Status: DISCONTINUED | OUTPATIENT
Start: 2019-09-10 | End: 2019-09-11

## 2019-09-10 RX ORDER — ACETAMINOPHEN 325 MG/1
650 TABLET ORAL EVERY 4 HOURS PRN
Status: DISCONTINUED | OUTPATIENT
Start: 2019-09-10 | End: 2019-09-11

## 2019-09-10 RX ORDER — DOCUSATE SODIUM 100 MG/1
100 CAPSULE, LIQUID FILLED ORAL 2 TIMES DAILY PRN
Status: DISCONTINUED | OUTPATIENT
Start: 2019-09-10 | End: 2019-09-11

## 2019-09-10 RX ORDER — NALBUPHINE HCL 10 MG/ML
10 AMPUL (ML) INJECTION
Status: DISCONTINUED | OUTPATIENT
Start: 2019-09-10 | End: 2019-09-11

## 2019-09-10 RX ORDER — SODIUM CHLORIDE, SODIUM LACTATE, POTASSIUM CHLORIDE, CALCIUM CHLORIDE 600; 310; 30; 20 MG/100ML; MG/100ML; MG/100ML; MG/100ML
INJECTION, SOLUTION INTRAVENOUS CONTINUOUS
Status: DISCONTINUED | OUTPATIENT
Start: 2019-09-10 | End: 2019-09-11

## 2019-09-10 RX ORDER — CLINDAMYCIN PHOSPHATE 900 MG/50ML
900 INJECTION INTRAVENOUS EVERY 8 HOURS
Status: DISCONTINUED | OUTPATIENT
Start: 2019-09-10 | End: 2019-09-11

## 2019-09-10 RX ORDER — SODIUM CHLORIDE 0.9 % (FLUSH) 0.9 %
10 SYRINGE (ML) INJECTION EVERY 12 HOURS SCHEDULED
Status: DISCONTINUED | OUTPATIENT
Start: 2019-09-10 | End: 2019-09-11

## 2019-09-10 RX ORDER — DIPHENHYDRAMINE HCL 25 MG
25 TABLET ORAL EVERY 4 HOURS PRN
Status: DISCONTINUED | OUTPATIENT
Start: 2019-09-10 | End: 2019-09-11

## 2019-09-10 RX ORDER — ONDANSETRON 2 MG/ML
4 INJECTION INTRAMUSCULAR; INTRAVENOUS EVERY 6 HOURS PRN
Status: DISCONTINUED | OUTPATIENT
Start: 2019-09-10 | End: 2019-09-11

## 2019-09-10 RX ADMIN — SODIUM CHLORIDE, POTASSIUM CHLORIDE, SODIUM LACTATE AND CALCIUM CHLORIDE: 600; 310; 30; 20 INJECTION, SOLUTION INTRAVENOUS at 22:45

## 2019-09-10 RX ADMIN — MISOPROSTOL 25 MCG: 100 TABLET ORAL at 21:11

## 2019-09-11 ENCOUNTER — ANESTHESIA EVENT (OUTPATIENT)
Dept: LABOR AND DELIVERY | Age: 24
DRG: 560 | End: 2019-09-11
Payer: COMMERCIAL

## 2019-09-11 ENCOUNTER — ANESTHESIA (OUTPATIENT)
Dept: LABOR AND DELIVERY | Age: 24
DRG: 560 | End: 2019-09-11
Payer: COMMERCIAL

## 2019-09-11 LAB — RUBELLA ANTIBODY IGG: 19 IU/ML

## 2019-09-11 PROCEDURE — 6370000000 HC RX 637 (ALT 250 FOR IP): Performed by: OBSTETRICS & GYNECOLOGY

## 2019-09-11 PROCEDURE — 3E0P7VZ INTRODUCTION OF HORMONE INTO FEMALE REPRODUCTIVE, VIA NATURAL OR ARTIFICIAL OPENING: ICD-10-PCS | Performed by: OBSTETRICS & GYNECOLOGY

## 2019-09-11 PROCEDURE — 3700000025 EPIDURAL BLOCK: Performed by: NURSE ANESTHETIST, CERTIFIED REGISTERED

## 2019-09-11 PROCEDURE — 2580000003 HC RX 258

## 2019-09-11 PROCEDURE — 6360000002 HC RX W HCPCS: Performed by: OBSTETRICS & GYNECOLOGY

## 2019-09-11 PROCEDURE — 59409 OBSTETRICAL CARE: CPT | Performed by: OBSTETRICS & GYNECOLOGY

## 2019-09-11 PROCEDURE — 88307 TISSUE EXAM BY PATHOLOGIST: CPT

## 2019-09-11 PROCEDURE — 1220000000 HC SEMI PRIVATE OB R&B

## 2019-09-11 PROCEDURE — 99024 POSTOP FOLLOW-UP VISIT: CPT | Performed by: OBSTETRICS & GYNECOLOGY

## 2019-09-11 PROCEDURE — 2580000003 HC RX 258: Performed by: OBSTETRICS & GYNECOLOGY

## 2019-09-11 PROCEDURE — 2500000003 HC RX 250 WO HCPCS: Performed by: OBSTETRICS & GYNECOLOGY

## 2019-09-11 PROCEDURE — 10907ZC DRAINAGE OF AMNIOTIC FLUID, THERAPEUTIC FROM PRODUCTS OF CONCEPTION, VIA NATURAL OR ARTIFICIAL OPENING: ICD-10-PCS | Performed by: OBSTETRICS & GYNECOLOGY

## 2019-09-11 RX ORDER — NALOXONE HYDROCHLORIDE 0.4 MG/ML
0.4 INJECTION, SOLUTION INTRAMUSCULAR; INTRAVENOUS; SUBCUTANEOUS PRN
Status: DISCONTINUED | OUTPATIENT
Start: 2019-09-11 | End: 2019-09-11

## 2019-09-11 RX ORDER — ONDANSETRON 2 MG/ML
4 INJECTION INTRAMUSCULAR; INTRAVENOUS EVERY 6 HOURS PRN
Status: DISCONTINUED | OUTPATIENT
Start: 2019-09-11 | End: 2019-09-13 | Stop reason: HOSPADM

## 2019-09-11 RX ORDER — SODIUM CHLORIDE, SODIUM LACTATE, POTASSIUM CHLORIDE, CALCIUM CHLORIDE 600; 310; 30; 20 MG/100ML; MG/100ML; MG/100ML; MG/100ML
INJECTION, SOLUTION INTRAVENOUS
Status: COMPLETED
Start: 2019-09-11 | End: 2019-09-11

## 2019-09-11 RX ORDER — NALBUPHINE HCL 10 MG/ML
5 AMPUL (ML) INJECTION EVERY 4 HOURS PRN
Status: DISCONTINUED | OUTPATIENT
Start: 2019-09-11 | End: 2019-09-11

## 2019-09-11 RX ORDER — ZOLPIDEM TARTRATE 5 MG/1
5 TABLET ORAL ONCE
Status: COMPLETED | OUTPATIENT
Start: 2019-09-11 | End: 2019-09-11

## 2019-09-11 RX ORDER — IBUPROFEN 600 MG/1
600 TABLET ORAL EVERY 6 HOURS PRN
Qty: 120 TABLET | Refills: 3 | Status: SHIPPED | OUTPATIENT
Start: 2019-09-11 | End: 2021-09-08

## 2019-09-11 RX ORDER — IBUPROFEN 600 MG/1
600 TABLET ORAL EVERY 6 HOURS PRN
Status: DISCONTINUED | OUTPATIENT
Start: 2019-09-11 | End: 2019-09-13 | Stop reason: HOSPADM

## 2019-09-11 RX ORDER — HYDROCODONE BITARTRATE AND ACETAMINOPHEN 5; 325 MG/1; MG/1
1 TABLET ORAL EVERY 4 HOURS PRN
Status: DISCONTINUED | OUTPATIENT
Start: 2019-09-11 | End: 2019-09-13 | Stop reason: HOSPADM

## 2019-09-11 RX ORDER — ACETAMINOPHEN 325 MG/1
650 TABLET ORAL EVERY 4 HOURS PRN
Status: DISCONTINUED | OUTPATIENT
Start: 2019-09-11 | End: 2019-09-13 | Stop reason: HOSPADM

## 2019-09-11 RX ORDER — ONDANSETRON 2 MG/ML
4 INJECTION INTRAMUSCULAR; INTRAVENOUS EVERY 6 HOURS PRN
Status: DISCONTINUED | OUTPATIENT
Start: 2019-09-11 | End: 2019-09-11

## 2019-09-11 RX ORDER — DOCUSATE SODIUM 100 MG/1
100 CAPSULE, LIQUID FILLED ORAL DAILY
Status: DISCONTINUED | OUTPATIENT
Start: 2019-09-11 | End: 2019-09-13 | Stop reason: HOSPADM

## 2019-09-11 RX ORDER — SODIUM CHLORIDE 0.9 % (FLUSH) 0.9 %
10 SYRINGE (ML) INJECTION EVERY 12 HOURS SCHEDULED
Status: DISCONTINUED | OUTPATIENT
Start: 2019-09-11 | End: 2019-09-13 | Stop reason: HOSPADM

## 2019-09-11 RX ORDER — HYDROCODONE BITARTRATE AND ACETAMINOPHEN 5; 325 MG/1; MG/1
2 TABLET ORAL EVERY 4 HOURS PRN
Status: DISCONTINUED | OUTPATIENT
Start: 2019-09-11 | End: 2019-09-13 | Stop reason: HOSPADM

## 2019-09-11 RX ORDER — LANOLIN 100 %
OINTMENT (GRAM) TOPICAL PRN
Status: DISCONTINUED | OUTPATIENT
Start: 2019-09-11 | End: 2019-09-13 | Stop reason: HOSPADM

## 2019-09-11 RX ORDER — SODIUM CHLORIDE 0.9 % (FLUSH) 0.9 %
10 SYRINGE (ML) INJECTION PRN
Status: DISCONTINUED | OUTPATIENT
Start: 2019-09-11 | End: 2019-09-13 | Stop reason: HOSPADM

## 2019-09-11 RX ADMIN — IBUPROFEN 600 MG: 600 TABLET, FILM COATED ORAL at 23:36

## 2019-09-11 RX ADMIN — HYDROCODONE BITARTRATE AND ACETAMINOPHEN 2 TABLET: 5; 325 TABLET ORAL at 12:10

## 2019-09-11 RX ADMIN — HYDROCODONE BITARTRATE AND ACETAMINOPHEN 2 TABLET: 5; 325 TABLET ORAL at 16:10

## 2019-09-11 RX ADMIN — SODIUM CHLORIDE, POTASSIUM CHLORIDE, SODIUM LACTATE AND CALCIUM CHLORIDE: 600; 310; 30; 20 INJECTION, SOLUTION INTRAVENOUS at 02:26

## 2019-09-11 RX ADMIN — ZOLPIDEM TARTRATE 5 MG: 5 TABLET ORAL at 00:14

## 2019-09-11 RX ADMIN — SODIUM CHLORIDE, POTASSIUM CHLORIDE, SODIUM LACTATE AND CALCIUM CHLORIDE: 600; 310; 30; 20 INJECTION, SOLUTION INTRAVENOUS at 03:45

## 2019-09-11 RX ADMIN — Medication 12 ML/HR: at 03:29

## 2019-09-11 RX ADMIN — SODIUM CHLORIDE, POTASSIUM CHLORIDE, SODIUM LACTATE AND CALCIUM CHLORIDE 1000 ML: 600; 310; 30; 20 INJECTION, SOLUTION INTRAVENOUS at 09:02

## 2019-09-11 RX ADMIN — IBUPROFEN 600 MG: 600 TABLET, FILM COATED ORAL at 10:22

## 2019-09-11 RX ADMIN — HYDROCODONE BITARTRATE AND ACETAMINOPHEN 1 TABLET: 5; 325 TABLET ORAL at 23:48

## 2019-09-11 RX ADMIN — Medication 10 ML: at 01:36

## 2019-09-11 RX ADMIN — NALBUPHINE HYDROCHLORIDE 10 MG: 10 INJECTION, SOLUTION INTRAMUSCULAR; INTRAVENOUS; SUBCUTANEOUS at 01:36

## 2019-09-11 ASSESSMENT — PAIN SCALES - GENERAL
PAINLEVEL_OUTOF10: 6
PAINLEVEL_OUTOF10: 6
PAINLEVEL_OUTOF10: 8
PAINLEVEL_OUTOF10: 3
PAINLEVEL_OUTOF10: 5
PAINLEVEL_OUTOF10: 9

## 2019-09-11 NOTE — H&P
Department of Obstetrics and Gynecology   History & Physical    Pt Name: Sherene Barthel  MRN: [id number] [place] #: [de-identified]  YOB: 1995  Estimated Date of Delivery: 19      HPI: The patient is a 30 y.o.  38w11d female who presents to L&d for induction of labor. Pt with limited prenatal care due to transportation. Pt with h/o TSVD x 1. Risks, benefits and alternative therapies for delivery reviewed and pt agrees to induction of labor. +FM, -VB, -LOF, -CTX    Allergies:     Allergies as of 09/10/2019 - Review Complete 09/10/2019   Allergen Reaction Noted    Amoxicillin Nausea Only 10/18/2012       Medications:    Current Facility-Administered Medications:     naloxone (NARCAN) injection 0.4 mg, 0.4 mg, Intravenous, PRN, Cher Gill MD    nalbuphine (NUBAIN) injection 5 mg, 5 mg, Intravenous, Q4H PRN, Cher Gill MD    ondansetron (ZOFRAN) injection 4 mg, 4 mg, Intravenous, Q6H PRN, Cher Gill MD    fentaNYL 125 mcg, ropivacaine 0.2% in sodium chloride 0.9% 127.5ml (OB) epidural, 12 mL/hr, Epidural, Continuous, Cher Gill MD, Last Rate: 12 mL/hr at 19 0329, 12 mL/hr at 19 0329    clindamycin (CLEOCIN) 900 mg in dextrose 5 % 50 mL IVPB, 900 mg, Intravenous, Q8H, Cher Gill MD    acetaminophen (TYLENOL) tablet 650 mg, 650 mg, Oral, Q4H PRN, Cher Gill MD    benzocaine-menthol (DERMOPLAST) 20-0.5 % spray, , Topical, PRN, Cher Gill MD    docusate sodium (COLACE) capsule 100 mg, 100 mg, Oral, BID PRN, Cher Gill MD    oxytocin (PITOCIN) 30 units in 500 mL infusion, 1 blossom-units/min, Intravenous, Continuous PRN, Cher Gill MD    diphenhydrAMINE (BENADRYL) tablet 25 mg, 25 mg, Oral, Q4H PRN, Cher Gill MD    lactated ringers infusion, , Intravenous, Continuous, Cher Gill MD, Last Rate: 125 mL/hr at 19 0345    nalbuphine (NUBAIN) injection 10 mg, 10 mg, Intravenous, Q2H PRN, Karen Samuels,

## 2019-09-11 NOTE — PROGRESS NOTES
Called CHUCKY Rowe) to inform him that patient was ready for epidural. He stated that he would be right in.

## 2019-09-11 NOTE — ANESTHESIA PRE PROCEDURE
MD Abril        sodium chloride flush 0.9 % injection 10 mL  10 mL Intravenous PRN Jose L Urban MD   10 mL at 09/11/19 0136    misoprostol (CYTOTEC) pre-split tablet TABS 25 mcg  25 mcg Oral 6 times per day Jose L Urban MD   25 mcg at 09/10/19 2111       Allergies: Allergies   Allergen Reactions    Amoxicillin Nausea Only       Problem List:    Patient Active Problem List   Diagnosis Code    Term pregnancy Z34.80    Normal labor [de-identified], Z37.9       Past Medical History:        Diagnosis Date    Postpartum depression        Past Surgical History:        Procedure Laterality Date    DOPPLER ECHOCARDIOGRAPHY  7/9/2013    TYMPANOSTOMY TUBE PLACEMENT         Social History:    Social History     Tobacco Use    Smoking status: Never Smoker    Smokeless tobacco: Never Used   Substance Use Topics    Alcohol use: No                                Counseling given: Not Answered      Vital Signs (Current):   Vitals:    09/10/19 2047 09/10/19 2048 09/10/19 2203 09/10/19 2348   BP: 136/82 136/82  124/74   Pulse: 104 104  85   Resp: 16   16   Temp: 36.9 °C (98.5 °F)   36.6 °C (97.8 °F)   TempSrc: Oral   Oral   Weight:   155 lb (70.3 kg)    Height:   5' 4\" (1.626 m)                                               BP Readings from Last 3 Encounters:   09/10/19 124/74   09/09/19 120/78   05/13/19 100/70       NPO Status:                                                                                 BMI:   Wt Readings from Last 3 Encounters:   09/10/19 155 lb (70.3 kg)   09/09/19 155 lb (70.3 kg)   05/13/19 139 lb (63 kg)     Body mass index is 26.61 kg/m².     CBC:   Lab Results   Component Value Date    WBC 10.8 09/10/2019    RBC 3.35 09/10/2019    RBC 4.50 04/14/2012    HGB 9.1 09/10/2019    HCT 27.9 09/10/2019    MCV 83.2 09/10/2019    RDW 14.5 09/10/2019     09/10/2019       CMP:   Lab Results   Component Value Date     09/10/2019    K 4.0 09/10/2019     09/10/2019    CO2 22 09/10/2019 BUN 7 09/10/2019    CREATININE 0.55 09/10/2019    GFRAA >60.0 09/10/2019    LABGLOM >60.0 09/10/2019    GLUCOSE 85 09/10/2019    GLUCOSE 84 04/14/2012    PROT 6.7 09/10/2019    CALCIUM 9.6 09/10/2019    BILITOT <0.2 09/10/2019    ALKPHOS 113 09/10/2019    AST 17 09/10/2019    ALT 6 09/10/2019       POC Tests: No results for input(s): POCGLU, POCNA, POCK, POCCL, POCBUN, POCHEMO, POCHCT in the last 72 hours. Coags:   Lab Results   Component Value Date    PROTIME 12.2 11/08/2015    INR 0.97 11/08/2015    APTT 25.0 05/21/2015       HCG (If Applicable):   Lab Results   Component Value Date    PREGTESTUR Negative 10/26/2018        ABGs: No results found for: PHART, PO2ART, OUT5GKQ, DXM1EDA, BEART, P1SJKAWT     Type & Screen (If Applicable):  No results found for: LABABO, LABRH    Anesthesia Evaluation    Airway: Mallampati: II  TM distance: >3 FB   Neck ROM: full  Mouth opening: > = 3 FB Dental: normal exam         Pulmonary:Negative Pulmonary ROS and normal exam                               Cardiovascular:Negative CV ROS                      Neuro/Psych:   Negative Neuro/Psych ROS              GI/Hepatic/Renal: Neg GI/Hepatic/Renal ROS            Endo/Other: Negative Endo/Other ROS                    Abdominal:           Vascular: negative vascular ROS.                                        Anesthesia Plan      epidural     ASA 2                 Plan discussed with surgical team.                  DES Poole - CRNA   9/11/2019

## 2019-09-11 NOTE — PROGRESS NOTES
Department of Obstetrics and Gynecology  Labor and Delivery   aKleRey Progress Note      SUBJECTIVE:  CTSP to review FHT. Strip with minimal variablitiy, no decels. OBJECTIVE:      Vitals:    /66   Pulse 126   Temp 97.7 °F (36.5 °C) (Oral)   Resp 18   Ht 5' 4\" (1.626 m)   Wt 155 lb (70.3 kg)   LMP 11/26/2018 (Approximate)   SpO2 100%   BMI 26.61 kg/m²       Fetal heart rate:       Baseline Heart Rate:  150        Accelerations:  absent       Long Term Variability:  minimal       Decelerations:  absent         Contraction frequency: 2 minutes    Fetal Presentation:  Cephalic    Membranes:  Ruptured meconium stained    Cervix:           Dilation:  8 cm         Effacement:  100%         Station:  0         Consistency:  soft         Position:  anterior        DATA:      ASSESSMENT & PLAN:    39 weeks induction with minimal variability, meconium and 8 cm. Attending notified. Anticipate delivery soon.

## 2019-09-11 NOTE — ANESTHESIA PROCEDURE NOTES
Epidural Block    Patient location during procedure: OB  Reason for block: labor epidural  Preanesthetic Checklist  Completed: patient identified, site marked, surgical consent, pre-op evaluation, timeout performed, IV checked, risks and benefits discussed, monitors and equipment checked, anesthesia consent given, oxygen available and patient being monitored  Epidural  Patient position: sitting  Prep: Betadine  Patient monitoring: cardiac monitor, continuous pulse ox and frequent blood pressure checks  Approach: midline  Location: lumbar (1-5)  Injection technique: ESTEFANI air  Provider prep: mask and sterile gloves  Needle  Needle gauge: 17 G  Catheter type: side hole  Catheter size: 19 G  Test dose: negative  Assessment  Sensory level: T4  Hemodynamics: stable  Attempts: 1

## 2019-09-12 LAB
GROUP B STREP CULTURE: ABNORMAL
HCT VFR BLD CALC: 26.5 % (ref 37–47)
HEMOGLOBIN: 8.5 G/DL (ref 12–16)
MCH RBC QN AUTO: 26.9 PG (ref 27–31.3)
MCHC RBC AUTO-ENTMCNC: 32 % (ref 33–37)
MCV RBC AUTO: 84.2 FL (ref 82–100)
ORGANISM: ABNORMAL
PDW BLD-RTO: 14.5 % (ref 11.5–14.5)
PLATELET # BLD: 233 K/UL (ref 130–400)
RBC # BLD: 3.15 M/UL (ref 4.2–5.4)
WBC # BLD: 14.4 K/UL (ref 4.8–10.8)

## 2019-09-12 PROCEDURE — 85027 COMPLETE CBC AUTOMATED: CPT

## 2019-09-12 PROCEDURE — 36415 COLL VENOUS BLD VENIPUNCTURE: CPT

## 2019-09-12 PROCEDURE — 6370000000 HC RX 637 (ALT 250 FOR IP): Performed by: OBSTETRICS & GYNECOLOGY

## 2019-09-12 PROCEDURE — 1220000000 HC SEMI PRIVATE OB R&B

## 2019-09-12 RX ORDER — FERROUS SULFATE 325(65) MG
325 TABLET ORAL 2 TIMES DAILY WITH MEALS
Status: DISCONTINUED | OUTPATIENT
Start: 2019-09-12 | End: 2019-09-13 | Stop reason: HOSPADM

## 2019-09-12 RX ADMIN — IBUPROFEN 600 MG: 600 TABLET, FILM COATED ORAL at 09:48

## 2019-09-12 RX ADMIN — HYDROCODONE BITARTRATE AND ACETAMINOPHEN 1 TABLET: 5; 325 TABLET ORAL at 16:06

## 2019-09-12 RX ADMIN — FERROUS SULFATE TAB 325 MG (65 MG ELEMENTAL FE) 325 MG: 325 (65 FE) TAB at 18:15

## 2019-09-12 RX ADMIN — DOCUSATE SODIUM 100 MG: 100 CAPSULE, LIQUID FILLED ORAL at 09:48

## 2019-09-12 ASSESSMENT — PAIN SCALES - GENERAL
PAINLEVEL_OUTOF10: 6
PAINLEVEL_OUTOF10: 5

## 2019-09-13 VITALS
OXYGEN SATURATION: 100 % | TEMPERATURE: 97.5 F | BODY MASS INDEX: 26.46 KG/M2 | SYSTOLIC BLOOD PRESSURE: 112 MMHG | RESPIRATION RATE: 14 BRPM | DIASTOLIC BLOOD PRESSURE: 71 MMHG | HEART RATE: 83 BPM | HEIGHT: 64 IN | WEIGHT: 155 LBS

## 2019-09-13 PROCEDURE — 6370000000 HC RX 637 (ALT 250 FOR IP): Performed by: OBSTETRICS & GYNECOLOGY

## 2019-09-13 PROCEDURE — 7200000001 HC VAGINAL DELIVERY

## 2019-09-13 RX ADMIN — FERROUS SULFATE TAB 325 MG (65 MG ELEMENTAL FE) 325 MG: 325 (65 FE) TAB at 08:28

## 2019-09-13 RX ADMIN — IBUPROFEN 600 MG: 600 TABLET, FILM COATED ORAL at 00:47

## 2019-09-13 RX ADMIN — DOCUSATE SODIUM 100 MG: 100 CAPSULE, LIQUID FILLED ORAL at 08:28

## 2019-09-13 RX ADMIN — IBUPROFEN 600 MG: 600 TABLET, FILM COATED ORAL at 08:28

## 2019-09-13 RX ADMIN — IBUPROFEN 600 MG: 600 TABLET, FILM COATED ORAL at 18:26

## 2019-09-13 ASSESSMENT — PAIN SCALES - GENERAL
PAINLEVEL_OUTOF10: 4
PAINLEVEL_OUTOF10: 6
PAINLEVEL_OUTOF10: 5

## 2021-09-08 ENCOUNTER — OFFICE VISIT (OUTPATIENT)
Dept: FAMILY MEDICINE CLINIC | Age: 26
End: 2021-09-08
Payer: COMMERCIAL

## 2021-09-08 VITALS
SYSTOLIC BLOOD PRESSURE: 98 MMHG | OXYGEN SATURATION: 99 % | BODY MASS INDEX: 21.34 KG/M2 | TEMPERATURE: 97.2 F | DIASTOLIC BLOOD PRESSURE: 60 MMHG | WEIGHT: 125 LBS | HEIGHT: 64 IN | HEART RATE: 88 BPM

## 2021-09-08 DIAGNOSIS — R05.9 COUGH: Primary | ICD-10-CM

## 2021-09-08 DIAGNOSIS — U07.1 COVID-19: ICD-10-CM

## 2021-09-08 LAB
Lab: ABNORMAL
PERFORMING INSTRUMENT: ABNORMAL
QC PASS/FAIL: ABNORMAL
SARS-COV-2, POC: DETECTED

## 2021-09-08 PROCEDURE — 99213 OFFICE O/P EST LOW 20 MIN: CPT | Performed by: NURSE PRACTITIONER

## 2021-09-08 PROCEDURE — 1036F TOBACCO NON-USER: CPT | Performed by: NURSE PRACTITIONER

## 2021-09-08 PROCEDURE — 87426 SARSCOV CORONAVIRUS AG IA: CPT | Performed by: NURSE PRACTITIONER

## 2021-09-08 PROCEDURE — G8420 CALC BMI NORM PARAMETERS: HCPCS | Performed by: NURSE PRACTITIONER

## 2021-09-08 PROCEDURE — G8427 DOCREV CUR MEDS BY ELIG CLIN: HCPCS | Performed by: NURSE PRACTITIONER

## 2021-09-08 SDOH — ECONOMIC STABILITY: FOOD INSECURITY: WITHIN THE PAST 12 MONTHS, THE FOOD YOU BOUGHT JUST DIDN'T LAST AND YOU DIDN'T HAVE MONEY TO GET MORE.: NEVER TRUE

## 2021-09-08 SDOH — ECONOMIC STABILITY: FOOD INSECURITY: WITHIN THE PAST 12 MONTHS, YOU WORRIED THAT YOUR FOOD WOULD RUN OUT BEFORE YOU GOT MONEY TO BUY MORE.: NEVER TRUE

## 2021-09-08 ASSESSMENT — PATIENT HEALTH QUESTIONNAIRE - PHQ9
SUM OF ALL RESPONSES TO PHQ QUESTIONS 1-9: 0
2. FEELING DOWN, DEPRESSED OR HOPELESS: 0
1. LITTLE INTEREST OR PLEASURE IN DOING THINGS: 0
SUM OF ALL RESPONSES TO PHQ9 QUESTIONS 1 & 2: 0

## 2021-09-08 ASSESSMENT — SOCIAL DETERMINANTS OF HEALTH (SDOH): HOW HARD IS IT FOR YOU TO PAY FOR THE VERY BASICS LIKE FOOD, HOUSING, MEDICAL CARE, AND HEATING?: NOT HARD AT ALL

## 2021-09-08 NOTE — PATIENT INSTRUCTIONS
Patient Education        Learning About Coronavirus [phone])  What is coronavirus (COVID-19)? COVID-19 is a disease caused by a new type of coronavirus. This illness was first found in December 2019. It has since spread worldwide. Coronaviruses are a large group of viruses. They cause the common cold. They also cause more serious illnesses like Middle East respiratory syndrome (MERS) and severe acute respiratory syndrome (SARS). COVID-19 is caused by a novel coronavirus. That means it's a new type that has not been seen in people before. What are the symptoms? Coronavirus (COVID-19) symptoms may include:  · Fever. · Cough. · Trouble breathing. · Chills or repeated shaking with chills. · Muscle pain. · Headache. · Sore throat. · New loss of taste or smell. · Vomiting. · Diarrhea. In severe cases, COVID-19 can cause pneumonia and make it hard to breathe without help from a machine. It can cause death. How is it diagnosed? COVID-19 is diagnosed with a viral test. This may also be called a PCR test or antigen test. It looks for evidence of the virus in your breathing passages or lungs (respiratory system). The test is most often done on a sample from the nose, throat, or lungs. It's sometimes done on a sample of saliva. One way a sample is collected is by putting a long swab into the back of your nose. How is it treated? Mild cases of COVID-19 can be treated at home. Serious cases need treatment in the hospital. Treatment may include medicines to reduce symptoms, plus breathing support such as oxygen therapy or a ventilator. Some people may be placed on their belly to help their oxygen levels. Treatments that may help people who have COVID-19 include:  Antiviral medicines. These medicines treat viral infections. Remdesivir is an example. Immune-based therapy. These medicines help the immune system fight COVID-19. One example is bamlanivimab. It's a monoclonal antibody. Blood thinners. These medicines help prevent blood clots. People with severe illness are at risk for blood clots. How can you protect yourself and others? The best way to protect yourself from getting sick is to:  · Avoid areas where there is an outbreak. · Avoid contact with people who may be infected. · Avoid crowds and try to stay at least 6 feet away from other people. · Wash your hands often, especially after you cough or sneeze. Use soap and water, and scrub for at least 20 seconds. If soap and water aren't available, use an alcohol-based hand . · Avoid touching your mouth, nose, and eyes. To help avoid spreading the virus to others:  · Stay home if you are sick or have been exposed to the virus. Don't go to school, work, or public areas. And don't use public transportation, ride-shares, or taxis unless you have no choice. · Wear a cloth face cover if you have to go to public areas. · Cover your mouth with a tissue when you cough or sneeze. Then throw the tissue in the trash and wash your hands right away. · If you're sick:  ? Leave your home only if you need to get medical care. But call the doctor's office first so they know you're coming. And wear a face cover. ? Wear the face cover whenever you're around other people. It can help stop the spread of the virus. ? Limit contact with pets and people in your home. If possible, stay in a separate bedroom and use a separate bathroom. ? Clean and disinfect your home every day. Use household  and disinfectant wipes or sprays. Take special care to clean things that you grab with your hands. These include doorknobs, remote controls, phones, and handles on your refrigerator and microwave. And don't forget countertops, tabletops, bathrooms, and computer keyboards. When should you call for help? Call 911 anytime you think you may need emergency care.  For example, call if you have life-threatening symptoms, such as:    · You have severe trouble breathing. (You can't talk at all.)     · You have constant chest pain or pressure.     · You are severely dizzy or lightheaded.     · You are confused or can't think clearly.     · Your face and lips have a blue color.     · You pass out (lose consciousness) or are very hard to wake up. Call your doctor now or seek immediate medical care if:    · You have moderate trouble breathing. (You can't speak a full sentence.)     · You are coughing up blood (more than about 1 teaspoon).     · You have signs of low blood pressure. These include feeling lightheaded; being too weak to stand; and having cold, pale, clammy skin. Watch closely for changes in your health, and be sure to contact your doctor if:    · Your symptoms get worse.     · You are not getting better as expected. Call before you go to the doctor's office. Follow their instructions. And wear a cloth face cover. Current as of: March 26, 2021               Content Version: 12.9  © 2006-2021 [employer], [employer]. Care instructions adapted under license by [facility] ([facility]). If you have questions about a medical condition or this instruction, always ask your healthcare professional. [place] [address] any warranty or liability for your use of this information.      We'll call with COVID-19 results  Results will also be available on your [facility] account, if activated  Things to Know:   - Do not leave home except to get medical care while waiting for your results  - Testing does not change treatment--> there is no medication that treats COVID-19  - Drink plenty of fluids and rest as much as needed  - May take over the counter medicine such as ibuprofen (aka Motrin/ Advil) or acetaminophen (aka Tylenol) for pain or fever, follow directions on label  - Continually monitor symptoms + contact a medical provider if symptoms are worsening, such as difficulty breathing  - Avoid exposure to environmental irritants/ allergens where possible    - Practice social distancing and wear a face mask when leaving home is necessary  - Symptoms may develop 2 days to 2 weeks following exposure to the virus- if you are exposed after testing, or if you were in the incubation period when tested, you could become ill with COVID-19 later    Keep a low threshold to go to ER or call 9-1-1 for new or suddenly worsening symptoms --> trouble breathing, high fevers that are unresponsive to fever-reducing medications, difficulty staying awake, vomiting/ unable to keep food or fluids down, any other symptoms that you think could be an emergency.

## 2021-09-08 NOTE — LETTER
NOTIFICATION RETURN TO WORK / SCHOOL    9/8/2021    Ms. Paige [phone]      To Whom It May Concern:    Nyla Black was tested for COVID-19 on 9/8, and the result was negative. She may return to work on 9/17. I recommend:return without restrictions    If there are questions or concerns, please have the patient contact our office.         Sincerely,      Vivian Moser, DES - CNP

## 2021-09-08 NOTE — PROGRESS NOTES
Subjective:      Patient ID: Cosmo Woody is a 27 y.o. female who presents today for:  Chief Complaint   Patient presents with    Concern For COVID-23     Mother who she lives with tested + yesterday Pt states sx started 2 days ago       HPI  Patient is here with c/o loss of taste and smell. She report HA, SOB, cough, chills, muscle aches, congestion, and sore throat. She has diarrhea and vomiting but that is better. Says cough is not productive. Says she is able to eat and drink normally. She denies any fever. She had + exposure in the last week. Says she is 17 weeks pregnant. She has not been taking anything for symptoms. Past Medical History:   Diagnosis Date    Postpartum depression      Past Surgical History:   Procedure Laterality Date    DOPPLER ECHOCARDIOGRAPHY  7/9/2013    TYMPANOSTOMY TUBE PLACEMENT       Social History     Socioeconomic History    Marital status: Single     Spouse name: Not on file    Number of children: Not on file    Years of education: Not on file    Highest education level: Not on file   Occupational History    Not on file   Tobacco Use    Smoking status: Never Smoker    Smokeless tobacco: Never Used   Vaping Use    Vaping Use: Never used   Substance and Sexual Activity    Alcohol use: No    Drug use: No    Sexual activity: Not Currently     Partners: Male   Other Topics Concern    Not on file   Social History Narrative    Not on file     Social Determinants of Health     Financial Resource Strain: Low Risk     Difficulty of Paying Living Expenses: Not hard at all   Food Insecurity: No Food Insecurity    Worried About [address] in the Last Year: Never true    [address] [facility] [address] in the Last Year: Never true   Transportation Needs: No Transportation Needs    Lack of Transportation (Medical): No    Lack of Transportation (Non-Medical):  No   Physical Activity:     Days of Exercise per Week:     Minutes of Exercise per Session:    Stress:  Feeling of Stress :    Social Connections:     Frequency of Communication with Friends and Family:     Frequency of Social Gatherings with Friends and Family:     Attends [employer] Services:     Active Member of Clubs or Organizations:     Attends Club or Organization Meetings:     Marital Status:    Intimate Partner Violence:     Fear of Current or Ex-Partner:     Emotionally Abused:     Physically Abused:     Sexually Abused:      Family History   Problem Relation Age of Onset    Cancer Maternal Grandfather         leukemia    Breast Cancer Neg Hx     Colon Cancer Neg Hx     Diabetes Neg Hx     Eclampsia Neg Hx     Hypertension Neg Hx     Ovarian Cancer Neg Hx      Labor Neg Hx     Spont Abortions Neg Hx     Stroke Neg Hx      Allergies   Allergen Reactions    Cephalexin     Amoxicillin Nausea Only     Current Outpatient Medications   Medication Sig Dispense Refill    Prenatal Multivit-Min-Fe-FA (PRENATAL/IRON PO) Take by mouth      ferrous sulfate (RENEE-VIKRAM) 325 (65 Fe) MG tablet Take 1 tablet by mouth 2 times daily 90 tablet 3    Prenatal Vit-Fe Fumarate-FA (PRENATAL VITAMIN PO) Take by mouth       No current facility-administered medications for this visit. Review of Systems   Constitutional: Positive for fatigue. Negative for activity change, appetite change, chills, diaphoresis, fever and unexpected weight change. HENT: Positive for congestion, postnasal drip, rhinorrhea, sinus pressure, sinus pain, sneezing and sore throat. Negative for ear discharge, ear pain, tinnitus, trouble swallowing and voice change. Eyes: Negative for pain, discharge and itching. Respiratory: Positive for cough. Negative for chest tightness, shortness of breath and wheezing. Cardiovascular: Negative for chest pain. Gastrointestinal: Positive for diarrhea, nausea and vomiting. Negative for abdominal pain. Musculoskeletal: Positive for myalgias. Negative for arthralgias. Skin: Negative for color change and rash. Neurological: Positive for headaches. Negative for dizziness, weakness and light-headedness. Hematological: Negative for adenopathy. Objective:   BP 98/60 (Site: Right Upper Arm, Position: Sitting, Cuff Size: Medium Adult)   Pulse 88   Temp 97.2 °F (36.2 °C) (Temporal)   Ht 5' 4\" (1.626 m)   Wt 125 lb (56.7 kg)   LMP  (Exact Date)   SpO2 99%   BMI 21.46 kg/m²     Physical Exam  Vitals reviewed. Constitutional:       General: She is awake. She is not in acute distress. Appearance: Normal appearance. She is well-developed, well-groomed and normal weight. She is not ill-appearing, toxic-appearing or diaphoretic. HENT:      Head: Normocephalic and atraumatic. Right Ear: Hearing, tympanic membrane, ear canal and external ear normal.      Left Ear: Hearing, tympanic membrane, ear canal and external ear normal.      Nose: Congestion and rhinorrhea present. Rhinorrhea is clear. Right Turbinates: Swollen. Left Turbinates: Swollen. Right Sinus: No maxillary sinus tenderness or frontal sinus tenderness. Left Sinus: No maxillary sinus tenderness or frontal sinus tenderness. Mouth/Throat:      Lips: Pink. Mouth: Mucous membranes are dry. Pharynx: Oropharynx is clear. Uvula midline. No pharyngeal swelling, oropharyngeal exudate, posterior oropharyngeal erythema or uvula swelling. Tonsils: No tonsillar exudate or tonsillar abscesses. 0 on the right. 0 on the left. Eyes:      General: Lids are normal.      Extraocular Movements: Extraocular movements intact. Conjunctiva/sclera: Conjunctivae normal.   Neck:      Trachea: Trachea normal.   Cardiovascular:      Rate and Rhythm: Normal rate and regular rhythm. Pulses: Normal pulses. Heart sounds: Normal heart sounds, S1 normal and S2 normal.   Pulmonary:      Effort: Pulmonary effort is normal.      Breath sounds: Normal breath sounds and air entry. Musculoskeletal:         General: Normal range of motion. Cervical back: Full passive range of motion without pain, normal range of motion and neck supple. Lymphadenopathy:      Cervical: No cervical adenopathy. Skin:     General: Skin is warm and dry. Capillary Refill: Capillary refill takes less than 2 seconds. Neurological:      General: No focal deficit present. Mental Status: She is alert and oriented to person, place, and time. Mental status is at baseline. Psychiatric:         Attention and Perception: Attention and perception normal.         Mood and Affect: Mood and affect normal.         Speech: Speech normal.         Behavior: Behavior normal. Behavior is cooperative. Thought Content: Thought content normal.         Cognition and Memory: Cognition and memory normal.         Judgment: Judgment normal.         Assessment:       Diagnosis Orders   1. Cough  POCT COVID-19, Antigen   2. COVID-19       No results found for this visit on 09/08/21. Plan:     Assessment & Plan   Morgan Vinson was seen today for concern for covid-19. Diagnoses and all orders for this visit:    Discussed with patient rapid Covid testing is +. Symptoms at this point are mild. Advised ER with worsening sx including difficulty breathing, uncontrolled fever, or s/s dehydration. Advised patient she can use tylenol as needed for HA or fever. Advised to monitor for fever. Advised to notify OBGYN of + covid results. Advised rest and increase fluids. Advised to remain in quarantine for 10 days- note given for work. Advised to call with any questions or concerns. Cough  -     POCT COVID-19, Antigen    COVID-19      Orders Placed This Encounter   Procedures    POCT COVID-19, Antigen     Order Specific Question:   Is this test for diagnosis or screening? Answer:   Diagnosis of ill patient     Order Specific Question:   Symptomatic for COVID-19 as defined by CDC?      Answer:   Yes     Order Specific Question:   Date of Symptom Onset     Answer:   9/6/2021     Order Specific Question:   Hospitalized for COVID-19? Answer:   No     Order Specific Question:   Admitted to ICU for COVID-19? Answer:   No     Order Specific Question:   Employed in healthcare setting? Answer:   No     Order Specific Question:   Resident in a congregate (group) care setting? Answer:   No     Order Specific Question:   Pregnant? Answer:   Yes     Order Specific Question:   Previously tested for COVID-19? Answer:   No     No orders of the defined types were placed in this encounter. Medications Discontinued During This Encounter   Medication Reason    ibuprofen (ADVIL;MOTRIN) 600 MG tablet LIST CLEANUP     Return for ER with worsening symptoms. .        Reviewed with the patient/family: current clinical status & medications. Side effects of the medication prescribed today, as well as treatment plan/rationale and result expectations have been discussed with the patient/family who expresses understanding. Patient will be discharged home in stable condition. Follow up with PCP to evaluate treatment results or return if symptoms worsen or fail to improve. Discussed signs and symptoms which require immediate follow-up in ED/call to 911. Understanding verbalized. I have reviewed the patient's medical history in detail and updated the computerized patient record.     Jennifer Abarca, APRN - CNP

## 2021-09-10 SDOH — ECONOMIC STABILITY: TRANSPORTATION INSECURITY
IN THE PAST 12 MONTHS, HAS LACK OF TRANSPORTATION KEPT YOU FROM MEETINGS, WORK, OR FROM GETTING THINGS NEEDED FOR DAILY LIVING?: NO

## 2021-09-10 SDOH — ECONOMIC STABILITY: TRANSPORTATION INSECURITY
IN THE PAST 12 MONTHS, HAS THE LACK OF TRANSPORTATION KEPT YOU FROM MEDICAL APPOINTMENTS OR FROM GETTING MEDICATIONS?: NO

## 2021-09-10 ASSESSMENT — ENCOUNTER SYMPTOMS
ABDOMINAL PAIN: 0
EYE DISCHARGE: 0
WHEEZING: 0
SINUS PAIN: 1
COLOR CHANGE: 0
CHEST TIGHTNESS: 0
RHINORRHEA: 1
SHORTNESS OF BREATH: 0
SORE THROAT: 1
NAUSEA: 1
DIARRHEA: 1
EYE ITCHING: 0
VOICE CHANGE: 0
EYE PAIN: 0
COUGH: 1
VOMITING: 1
SINUS PRESSURE: 1
TROUBLE SWALLOWING: 0

## 2023-01-16 ENCOUNTER — OFFICE VISIT (OUTPATIENT)
Dept: PEDIATRICS CLINIC | Age: 28
End: 2023-01-16
Payer: COMMERCIAL

## 2023-01-16 VITALS
WEIGHT: 101 LBS | OXYGEN SATURATION: 98 % | TEMPERATURE: 98.5 F | HEART RATE: 103 BPM | HEIGHT: 64 IN | BODY MASS INDEX: 17.24 KG/M2

## 2023-01-16 DIAGNOSIS — F33.1 MODERATE EPISODE OF RECURRENT MAJOR DEPRESSIVE DISORDER (HCC): ICD-10-CM

## 2023-01-16 DIAGNOSIS — G47.9 SLEEP DISTURBANCE: ICD-10-CM

## 2023-01-16 DIAGNOSIS — F41.1 GENERALIZED ANXIETY DISORDER: Primary | ICD-10-CM

## 2023-01-16 PROCEDURE — G8427 DOCREV CUR MEDS BY ELIG CLIN: HCPCS | Performed by: NURSE PRACTITIONER

## 2023-01-16 PROCEDURE — G8484 FLU IMMUNIZE NO ADMIN: HCPCS | Performed by: NURSE PRACTITIONER

## 2023-01-16 PROCEDURE — 1036F TOBACCO NON-USER: CPT | Performed by: NURSE PRACTITIONER

## 2023-01-16 PROCEDURE — G8419 CALC BMI OUT NRM PARAM NOF/U: HCPCS | Performed by: NURSE PRACTITIONER

## 2023-01-16 PROCEDURE — 99202 OFFICE O/P NEW SF 15 MIN: CPT | Performed by: NURSE PRACTITIONER

## 2023-01-16 RX ORDER — CHOLECALCIFEROL (VITAMIN D3) 125 MCG
CAPSULE ORAL
COMMUNITY
Start: 2022-12-13

## 2023-01-16 RX ORDER — HYDROXYZINE PAMOATE 25 MG/1
25 CAPSULE ORAL 3 TIMES DAILY PRN
Qty: 28 CAPSULE | Refills: 1 | Status: SHIPPED | OUTPATIENT
Start: 2023-01-16 | End: 2023-01-20

## 2023-01-16 RX ORDER — PYRIDOXINE HCL (VITAMIN B6) 50 MG
TABLET ORAL
COMMUNITY
Start: 2022-12-13

## 2023-01-16 NOTE — PROGRESS NOTES
Subjective:      Patient ID: Jina Candelario is a 32 y.o. female who present today with:      Chief Complaint   Patient presents with    New Patient    Depression    Anxiety     Patient is new to me today  Here to discuss management of anxiety and depression  Has had it for the past few years  It has been progressing  No longer able to manage it on her own  Her PCP started her on Zoloft 3 months ago  Has not made much difference  She has a history of anemia, low vitamin d, and low vitamin b  Patient would like to change medication  She is a single mother to 3 young kids  Lots of stress with money   Not a good support system  Sleep is terrible  Trouble both falling and staying asleep    Past Medical History:   Diagnosis Date    Anemia     Herpesviral infection of urogenital system, 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    Mild major depression ([address] 1/24/2023    Postpartum depression     Vitamin B deficiency     Vitamin D deficiency      Patient Active Problem List    Diagnosis Date Noted    Anemia 01/24/2023    Vitamin D deficiency 01/24/2023    Vitamin B deficiency 01/24/2023    Mild major depression ([address] 01/24/2023    Thrombocythemia 01/24/2023    Leukocytosis 01/24/2023    Herpesviral infection of urogenital system, 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    Normal labor 09/10/2019     Past Surgical History:   Procedure Laterality Date    DOPPLER ECHOCARDIOGRAPHY  7/9/2013    TYMPANOSTOMY TUBE PLACEMENT       Social History     Socioeconomic History    Marital status: Single     Spouse name: None    Number of children: None    Years of education: None    Highest education level: None   Tobacco Use    Smoking status: Never    Smokeless tobacco: Never   Vaping Use    Vaping Use: Never used   Substance and Sexual Activity    Alcohol use: No    Drug use: No    Sexual activity: Not Currently     Partners: Male     Current Outpatient Medications on File Prior to Visit   Medication Sig Dispense Refill    Cholecalciferol (VITAMIN D3) 50 MCG (2000 UT) TABS take 1 tablet by mouth once daily      B Complex Vitamins (RA B-COMPLEX) TABS take 1 tablet by mouth once daily      Prenatal Multivit-Min-Fe-FA (PRENATAL/IRON PO) Take by mouth      ferrous sulfate (RENEE-VIKRAM) 325 (65 Fe) MG tablet Take 1 tablet by mouth 2 times daily 90 tablet 3    Prenatal Vit-Fe Fumarate-FA (PRENATAL VITAMIN PO) Take by mouth       No current facility-administered medications on file prior to visit. Allergies   Allergen Reactions    Cephalexin     Amoxicillin Nausea Only      Review of Systems   Constitutional:  Positive for activity change. Negative for appetite change, chills, diaphoresis, fatigue and fever. HENT:  Negative for congestion, ear pain, mouth sores, rhinorrhea, sore throat and trouble swallowing. Eyes:  Negative for photophobia, pain, discharge, redness and itching. Respiratory:  Negative for cough, chest tightness, shortness of breath and wheezing. Cardiovascular:  Negative for chest pain. Gastrointestinal:  Negative for abdominal pain, constipation, diarrhea, nausea and vomiting. Genitourinary:  Negative for dysuria, frequency, hematuria and urgency. Musculoskeletal:  Negative for arthralgias and myalgias. Skin:  Negative for rash. Neurological:  Negative for seizures, syncope and headaches. Psychiatric/Behavioral:  Positive for dysphoric mood and sleep disturbance. The patient is nervous/anxious. Objective:      Vitals:    01/16/23 1010   Pulse: (!) 103   Temp: 98.5 °F (36.9 °C)   TempSrc: Temporal   SpO2: 98%   Weight: 101 lb (45.8 kg)   Height: 5' 4\" (1.626 m)     Physical Exam  Constitutional:       General: She is not in acute distress. Appearance: Normal appearance. She is not ill-appearing. Eyes:      General: Lids are normal. Vision grossly intact. Extraocular Movements: Extraocular movements intact. Conjunctiva/sclera: Conjunctivae normal.      Pupils: Pupils are equal, round, and reactive to light. Cardiovascular:      Rate and Rhythm: Normal rate and regular rhythm. Heart sounds: Normal heart sounds. Pulmonary:      Effort: Pulmonary effort is normal.      Breath sounds: Normal breath sounds and air entry. Skin:     General: Skin is warm and dry. Findings: No rash. Neurological:      Mental Status: She is alert. Assessment & Plan:   Landen Orellana was seen today for new patient, depression and anxiety. Diagnoses and all orders for this visit:    Generalized anxiety disorder  -     hydrOXYzine pamoate (VISTARIL) 25 MG capsule; Take 1 capsule by mouth 3 times daily as needed for Anxiety  -[employer] testing done today    Moderate episode of recurrent major depressive disorder [facility]  -[employer] testing done today    Sleep disturbance  -     hydrOXYzine pamoate (VISTARIL) 25 MG capsule; Take 1 capsule by mouth 3 times daily as needed for Anxiety    Side effects, adverse effects of the medication prescribed today, as well as treatment plan/ rationale and result expectations have been discussed with the patient who expresses understanding and desires to proceed. Close follow up to evaluate treatment results and for coordination of care. I have reviewed the patient's medical history in detail and updated the computerized patient record. As always, patient is advised that if symptoms worsen in any way they will proceed to the nearest emergency room. Follow up in 1 week and as needed.     DES Lal - CNP

## 2023-01-20 ENCOUNTER — TELEMEDICINE (OUTPATIENT)
Dept: PEDIATRICS CLINIC | Age: 28
End: 2023-01-20
Payer: COMMERCIAL

## 2023-01-20 DIAGNOSIS — F32.0 MILD MAJOR DEPRESSION (HCC): ICD-10-CM

## 2023-01-20 DIAGNOSIS — F41.1 GENERALIZED ANXIETY DISORDER: Primary | ICD-10-CM

## 2023-01-20 PROCEDURE — 99442 PR PHYS/QHP TELEPHONE EVALUATION 11-20 MIN: CPT | Performed by: NURSE PRACTITIONER

## 2023-01-20 RX ORDER — BUSPIRONE HYDROCHLORIDE 5 MG/1
5 TABLET ORAL 3 TIMES DAILY PRN
Qty: 60 TABLET | Refills: 0 | Status: SHIPPED | OUTPATIENT
Start: 2023-01-20 | End: 2023-02-19

## 2023-01-20 RX ORDER — FLUOXETINE 10 MG/1
10 CAPSULE ORAL DAILY
Qty: 30 CAPSULE | Refills: 1 | Status: SHIPPED | OUTPATIENT
Start: 2023-01-20

## 2023-01-20 NOTE — PROGRESS NOTES
2023    TELEHEALTH EVALUATION -- Audio/Visual (During XWEPV-31 public health emergency)    Due to COVID 19 outbreak, patient's office visit was converted to a virtual visit. Patient was contacted and agreed to proceed with a virtual visit via [email or web address]  The risks and benefits of converting to a virtual visit were discussed in light of the current infectious disease epidemic. Patient also understood that insurance coverage and co-pays are up to their individual insurance plans. HPI:    Romel Banks (:  1995) has requested an audio/video evaluation for the following concern(s):    Patient did not like the atarax  Dried mouth and tongue out  Makes her tongue sore  Took it at night    [employer] report is back  Will prescribe accordingly    Review of Systems   Constitutional:  Positive for activity change, appetite change and fatigue. Negative for chills, diaphoresis and fever. HENT:  Negative for congestion, ear pain, mouth sores, postnasal drip, rhinorrhea, sinus pressure, sinus pain, sore throat and trouble swallowing. Eyes:  Negative for photophobia, pain, discharge, redness and itching. Respiratory:  Negative for cough, chest tightness, shortness of breath and wheezing. Cardiovascular:  Negative for chest pain. Gastrointestinal:  Negative for abdominal pain, constipation, diarrhea, nausea and vomiting. Genitourinary:  Negative for difficulty urinating, dysuria, flank pain, frequency, hematuria and urgency. Musculoskeletal:  Negative for arthralgias and myalgias. Skin:  Negative for rash. Neurological:  Negative for seizures, syncope and headaches. Psychiatric/Behavioral:  Positive for dysphoric mood and sleep disturbance. The patient is nervous/anxious. Prior to Visit Medications    Medication Sig Taking?  Authorizing Provider   B Complex Vitamins (VITAMIN B COMPLEX PO) Take by mouth Yes Historical Provider, MD   FLUoxetine (PROZAC) 10 MG capsule Take 1 capsule by mouth daily Yes DES Tipton CNP   busPIRone (BUSPAR) 5 MG tablet Take 1 tablet by mouth 3 times daily as needed (anxiety) Yes DES Tipton CNP   Cholecalciferol (VITAMIN D3) 50 MCG (2000 UT) TABS take 1 tablet by mouth once daily  Historical Provider, MD   B Complex Vitamins (RA B-COMPLEX) TABS take 1 tablet by mouth once daily  Historical Provider, MD   Prenatal Multivit-Min-Fe-FA (PRENATAL/IRON PO) Take by mouth  Historical Provider, MD   ferrous sulfate (RENEE-VIKRAM) 325 (65 Fe) MG tablet Take 1 tablet by mouth 2 times daily  Moraima Moody MD   Prenatal Vit-Fe Fumarate-FA (PRENATAL VITAMIN PO) Take by mouth  Historical Provider, MD       Social History     Tobacco Use    Smoking status: Never    Smokeless tobacco: Never   Vaping Use    Vaping Use: Never used   Substance Use Topics    Alcohol use: No    Drug use: No        Allergies   Allergen Reactions    Cephalexin     Amoxicillin Nausea Only   ,   Past Medical History:   Diagnosis Date    Anemia     Herpesviral infection of urogenital system, 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    Mild major depression ([facility] 1/24/2023    Postpartum depression     Vitamin B deficiency     Vitamin D deficiency    ,   Past Surgical History:   Procedure Laterality Date    DOPPLER ECHOCARDIOGRAPHY  7/9/2013    TYMPANOSTOMY TUBE PLACEMENT         PHYSICAL EXAMINATION:  [ INSTRUCTIONS:  \"[x]\" Indicates a positive item  \"[]\" Indicates a negative item  -- DELETE ALL ITEMS NOT EXAMINED]  [x] Alert  [x] Oriented to person/place/time    [x] No apparent distress  [] Toxic appearing    [] Face flushed appearing [] Sclera clear  [] Lips are cyanotic      [x] Breathing appears normal  [] Appears tachypneic      [] Rash on visible skin    [] Cranial Nerves II-XII grossly intact    [] Motor grossly intact in visible upper extremities    [] Motor grossly intact in visible lower extremities    [] Normal Mood  [] Anxious appearing    [] Depressed appearing  [] Confused appearing      [] Poor short term memory  [] Poor long term memory    [] OTHER:      Due to this being a TeleHealth encounter, evaluation of the following organ systems is limited: Vitals/Constitutional/EENT/Resp/CV/GI//MS/Neuro/Skin/Heme-Lymph-Imm. Diagnoses and all orders for this visit:    Generalized anxiety disorder  -     FLUoxetine (PROZAC) 10 MG capsule; Take 1 capsule by mouth daily  -     busPIRone (BUSPAR) 5 MG tablet; Take 1 tablet by mouth 3 times daily as needed (anxiety)    Mild major depression (HCC)  -     FLUoxetine (PROZAC) 10 MG capsule; Take 1 capsule by mouth daily    Side effects, adverse effects of the medication prescribed today, as well as treatment plan/ rationale and result expectations have been discussed with the patient who expresses understanding and desires to proceed. Close follow up to evaluate treatment results and for coordination of care. I have reviewed the patient's medical history in detail and updated the computerized patient record. As always, patient is advised that if symptoms worsen in any way they will proceed to the nearest emergency room. Follow up in 48-72 hours if symptoms persist or worsen and as needed    Total time spent for record review, face to face time, and medical decision making was 15 minutes. An  electronic signature was used to authenticate this note. --DES Trammell - CNP on 1/30/2023 at 3:28 PM        Pursuant to the emergency declaration under the [facility], [facility] waiver authority and the [employer] Act, this Virtual  Visit was conducted, with patient's consent, to reduce the patient's risk of exposure to COVID-19 and provide continuity of care for an established patient. Services were provided through a video synchronous discussion virtually to substitute for in-person clinic visit.

## 2023-01-24 PROBLEM — D64.9 ANEMIA: Status: ACTIVE | Noted: 2023-01-24

## 2023-01-24 PROBLEM — D75.839 THROMBOCYTHEMIA: Status: ACTIVE | Noted: 2023-01-24

## 2023-01-24 PROBLEM — E55.9 VITAMIN D DEFICIENCY: Status: ACTIVE | Noted: 2023-01-24

## 2023-01-24 PROBLEM — D72.829 LEUKOCYTOSIS: Status: ACTIVE | Noted: 2023-01-24

## 2023-01-24 PROBLEM — E53.9 VITAMIN B DEFICIENCY: Status: ACTIVE | Noted: 2023-01-24

## 2023-01-24 PROBLEM — Z34.90 TERM PREGNANCY: Status: RESOLVED | Noted: 2019-09-09 | Resolved: 2023-01-24

## 2023-01-24 PROBLEM — F32.0 MILD MAJOR DEPRESSION (HCC): Status: ACTIVE | Noted: 2023-01-24

## 2023-01-24 PROBLEM — A60.00 HERPESVIRAL INFECTION OF UROGENITAL SYSTEM, UNSPECIFIED: Status: ACTIVE | Noted: 2018-11-29

## 2023-01-24 ASSESSMENT — ENCOUNTER SYMPTOMS
RHINORRHEA: 0
EYE REDNESS: 0
CONSTIPATION: 0
ABDOMINAL PAIN: 0
CHEST TIGHTNESS: 0
SHORTNESS OF BREATH: 0
EYE DISCHARGE: 0
SORE THROAT: 0
DIARRHEA: 0
NAUSEA: 0
COUGH: 0
VOMITING: 0
EYE PAIN: 0
EYE ITCHING: 0
PHOTOPHOBIA: 0
TROUBLE SWALLOWING: 0
WHEEZING: 0

## 2023-01-24 ASSESSMENT — VISUAL ACUITY: OU: 1

## 2023-01-30 ASSESSMENT — ENCOUNTER SYMPTOMS
VOMITING: 0
TROUBLE SWALLOWING: 0
SORE THROAT: 0
SHORTNESS OF BREATH: 0
ABDOMINAL PAIN: 0
EYE PAIN: 0
NAUSEA: 0
EYE DISCHARGE: 0
CONSTIPATION: 0
SINUS PAIN: 0
RHINORRHEA: 0
EYE ITCHING: 0
SINUS PRESSURE: 0
COUGH: 0
PHOTOPHOBIA: 0
DIARRHEA: 0
WHEEZING: 0
CHEST TIGHTNESS: 0
EYE REDNESS: 0

## 2023-02-25 ENCOUNTER — TELEMEDICINE (OUTPATIENT)
Dept: FAMILY MEDICINE CLINIC | Age: 28
End: 2023-02-25
Payer: COMMERCIAL

## 2023-02-25 DIAGNOSIS — F32.0 MILD MAJOR DEPRESSION (HCC): Primary | ICD-10-CM

## 2023-02-25 DIAGNOSIS — G47.9 SLEEP DISTURBANCE: ICD-10-CM

## 2023-02-25 DIAGNOSIS — F41.1 GENERALIZED ANXIETY DISORDER: ICD-10-CM

## 2023-02-25 PROCEDURE — 99443 PR PHYS/QHP TELEPHONE EVALUATION 21-30 MIN: CPT | Performed by: NURSE PRACTITIONER

## 2023-02-25 RX ORDER — DOXEPIN HYDROCHLORIDE 10 MG/1
10 CAPSULE ORAL NIGHTLY
Qty: 30 CAPSULE | Refills: 1 | Status: SHIPPED | OUTPATIENT
Start: 2023-02-25

## 2023-02-25 RX ORDER — FLUOXETINE HYDROCHLORIDE 20 MG/1
20 CAPSULE ORAL DAILY
Qty: 30 CAPSULE | Refills: 1 | Status: SHIPPED | OUTPATIENT
Start: 2023-02-25

## 2023-02-25 ASSESSMENT — ENCOUNTER SYMPTOMS
EYE ITCHING: 0
PHOTOPHOBIA: 0
NAUSEA: 0
EYE PAIN: 0
TROUBLE SWALLOWING: 0
RHINORRHEA: 0
SHORTNESS OF BREATH: 0
EYE REDNESS: 0
WHEEZING: 0
COUGH: 0
EYE DISCHARGE: 0
CHEST TIGHTNESS: 0
ABDOMINAL PAIN: 0
SORE THROAT: 0
CONSTIPATION: 0
DIARRHEA: 0
VOMITING: 0

## 2023-02-25 NOTE — PROGRESS NOTES
2023    TELEHEALTH EVALUATION -- Audio/Visual (During FDALI-33 public health emergency)    Due to Antolin 19 outbreak, patient's office visit was converted to a virtual visit. Patient was contacted and agreed to proceed with a virtual visit via [employer]. me  The risks and benefits of converting to a virtual visit were discussed in light of the current infectious disease epidemic. Patient also understood that insurance coverage and co-pays are up to their individual insurance plans. HPI:    Girma Franklin (:  1995) has requested an audio/video evaluation for the following concern(s):    Not taking the buspar  Tried it for a week not working  Sometimes still anxious during the day    Cannot sleep at night  Hard time staying asleep  Up every 1-2 hours  Falling asleep is not really a problem    Taking the prozac  Thinks its working  Could use a little boost    Feeling a little less anxious  Sometimes OK during the day    Review of Systems   Constitutional:  Positive for activity change. Negative for appetite change, chills, diaphoresis, fatigue and fever. HENT:  Negative for congestion, ear pain, mouth sores, rhinorrhea, sore throat and trouble swallowing. Eyes:  Negative for photophobia, pain, discharge, redness and itching. Respiratory:  Negative for cough, chest tightness, shortness of breath and wheezing. Cardiovascular:  Negative for chest pain. Gastrointestinal:  Negative for abdominal pain, constipation, diarrhea, nausea and vomiting. Genitourinary:  Negative for dysuria, flank pain, frequency, hematuria and urgency. Musculoskeletal:  Negative for arthralgias and myalgias. Skin:  Negative for rash. Neurological:  Negative for seizures, syncope and headaches. Psychiatric/Behavioral:  Positive for dysphoric mood and sleep disturbance. The patient is nervous/anxious. Prior to Visit Medications    Medication Sig Taking?  Authorizing Provider   B Complex Vitamins (VITAMIN B COMPLEX PO) Take by mouth  Historical Provider, MD   FLUoxetine (PROZAC) 10 MG capsule Take 1 capsule by mouth daily  Tracey Carreon, APRN - CNP   Cholecalciferol (VITAMIN D3) 50 MCG (2000 UT) TABS take 1 tablet by mouth once daily  Historical Provider, MD   B Complex Vitamins (RA B-COMPLEX) TABS take 1 tablet by mouth once daily  Historical Provider, MD   Prenatal Multivit-Min-Fe-FA (PRENATAL/IRON PO) Take by mouth  Historical Provider, MD   ferrous sulfate (RENEE-VIKRAM) 325 (65 Fe) MG tablet Take 1 tablet by mouth 2 times daily  Carli Pagan MD   Prenatal Vit-Fe Fumarate-FA (PRENATAL VITAMIN PO) Take by mouth  Historical Provider, MD       Social History     Tobacco Use    Smoking status: Never    Smokeless tobacco: Never   Vaping Use    Vaping Use: Never used   Substance Use Topics    Alcohol use: No    Drug use: No        Allergies   Allergen Reactions    Cephalexin     Amoxicillin Nausea Only   ,   Past Medical History:   Diagnosis Date    Anemia     Herpesviral infection of urogenital system, 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    Mild major depression ([facility] [address] 1/24/2023    Postpartum depression     Vitamin B deficiency     Vitamin D deficiency    ,   Past Surgical History:   Procedure Laterality Date    DOPPLER ECHOCARDIOGRAPHY  7/9/2013    TYMPANOSTOMY TUBE PLACEMENT         PHYSICAL EXAMINATION:  [ INSTRUCTIONS:  \"[x]\" Indicates a positive item  \"[]\" Indicates a negative item  -- DELETE ALL ITEMS NOT EXAMINED]  [x] Alert  [x] Oriented to person/place/time    [x] No apparent distress  [] Toxic appearing    [] Face flushed appearing [] Sclera clear  [] Lips are cyanotic      [x] Breathing appears normal  [] Appears tachypneic      [] Rash on visible skin    [] Cranial Nerves II-XII grossly intact    [] Motor grossly intact in visible upper extremities    [] Motor grossly intact in visible lower extremities    [] Normal Mood  [] Anxious appearing    [] Depressed appearing  [] Confused appearing      [] Poor short term memory  [] Poor long term memory    [] OTHER:      Due to this being a TeleHealth encounter, evaluation of the following organ systems is limited: Vitals/Constitutional/EENT/Resp/CV/GI//MS/Neuro/Skin/Heme-Lymph-Imm. Diagnoses and all orders for this visit:    Mild major depression (HCC)  -     FLUoxetine (PROZAC) 20 MG capsule; Take 1 capsule by mouth daily    Generalized anxiety disorder  -     FLUoxetine (PROZAC) 20 MG capsule; Take 1 capsule by mouth daily    Sleep disturbance  -     doxepin (SINEQUAN) 10 MG capsule; Take 1 capsule by mouth nightly    Side effects, adverse effects of the medication prescribed today, as well as treatment plan/ rationale and result expectations have been discussed with the patient who expresses understanding and desires to proceed. Close follow up to evaluate treatment results and for coordination of care. I have reviewed the patient's medical history in detail and updated the computerized patient record. As always, patient is advised that if symptoms worsen in any way they will proceed to the nearest emergency room. Follow up in 2 weeks and as needed    Total time spent for record review, face to face time, and medical decision making was 25 minutes. An  electronic signature was used to authenticate this note. --DES Eason - CNP on 2/25/2023 at 10:59 AM    Pursuant to the emergency declaration under the [facility] waiver authority and the [employer] Act, this Virtual  Visit was conducted, with patient's consent, to reduce the patient's risk of exposure to COVID-19 and provide continuity of care for an established patient. Services were provided through a video synchronous discussion virtually to substitute for in-person clinic visit.

## 2023-03-16 ENCOUNTER — TELEMEDICINE (OUTPATIENT)
Dept: FAMILY MEDICINE CLINIC | Age: 28
End: 2023-03-16
Payer: COMMERCIAL

## 2023-03-16 DIAGNOSIS — F41.1 GENERALIZED ANXIETY DISORDER: ICD-10-CM

## 2023-03-16 DIAGNOSIS — F32.0 MILD MAJOR DEPRESSION (HCC): ICD-10-CM

## 2023-03-16 DIAGNOSIS — G47.8 SLEEP DYSFUNCTION WITH AROUSAL DISTURBANCE: Primary | ICD-10-CM

## 2023-03-16 PROCEDURE — 99443 PR PHYS/QHP TELEPHONE EVALUATION 21-30 MIN: CPT | Performed by: NURSE PRACTITIONER

## 2023-03-16 RX ORDER — ZOLPIDEM TARTRATE 5 MG/1
5 TABLET ORAL NIGHTLY PRN
Qty: 30 TABLET | Refills: 0 | Status: SHIPPED | OUTPATIENT
Start: 2023-03-16 | End: 2023-04-15

## 2023-03-16 ASSESSMENT — ENCOUNTER SYMPTOMS
EYE ITCHING: 0
NAUSEA: 0
DIARRHEA: 0
ABDOMINAL PAIN: 0
SHORTNESS OF BREATH: 0
WHEEZING: 0
VOMITING: 0
CHEST TIGHTNESS: 0
EYE PAIN: 0
SORE THROAT: 0
EYE DISCHARGE: 0
TROUBLE SWALLOWING: 0
RHINORRHEA: 0
COUGH: 0

## 2023-03-16 NOTE — PROGRESS NOTES
3/16/2023    TELEHEALTH EVALUATION -- Audio/Visual (During BQYVP-60 public health emergency)    Due to Antolin 19 outbreak, patient's office visit was converted to a virtual visit. Patient was contacted and agreed to proceed with a virtual visit via [employer]. me  The risks and benefits of converting to a virtual visit were discussed in light of the current infectious disease epidemic. Patient also understood that insurance coverage and co-pays are up to their individual insurance plans. HPI:    Norris Rutledge (:  1995) has requested an audio/video evaluation for the following concern(s):    Still cannot sleep  Anxiety is fine  Tried 2 of the doxepin and it didn't work  Does not want to take the gabapentin  Has taken it in the past and it makes her dizzy    Review of Systems   Constitutional:  Positive for activity change and fatigue. Negative for appetite change, chills, diaphoresis and fever. HENT:  Negative for congestion, ear pain, mouth sores, rhinorrhea, sore throat and trouble swallowing. Eyes:  Negative for pain, discharge and itching. Respiratory:  Negative for cough, chest tightness, shortness of breath and wheezing. Cardiovascular:  Negative for chest pain. Gastrointestinal:  Negative for abdominal pain, diarrhea, nausea and vomiting. Musculoskeletal:  Negative for arthralgias and myalgias. Skin:  Negative for rash. Neurological:  Negative for seizures, syncope and headaches. Psychiatric/Behavioral:  Positive for sleep disturbance. Negative for decreased concentration and dysphoric mood. The patient is not nervous/anxious and is not hyperactive. Prior to Visit Medications    Medication Sig Taking?  Authorizing Provider   doxepin (SINEQUAN) 10 MG capsule Take 1 capsule by mouth nightly  Cleta Hodgkins, APRN - CNP   FLUoxetine (PROZAC) 20 MG capsule Take 1 capsule by mouth daily  Cleta Hodgkins, APRN - CNP   B Complex Vitamins (VITAMIN B COMPLEX PO) Take by mouth  Historical Provider, MD   FLUoxetine (PROZAC) 10 MG capsule Take 1 capsule by mouth daily  Jenni Rahman APRN - CNP   Cholecalciferol (VITAMIN D3) 50 MCG (2000 UT) TABS take 1 tablet by mouth once daily  Historical Provider, MD   B Complex Vitamins (RA B-COMPLEX) TABS take 1 tablet by mouth once daily  Historical Provider, MD   Prenatal Multivit-Min-Fe-FA (PRENATAL/IRON PO) Take by mouth  Historical Provider, MD   ferrous sulfate (RENEE-VIKRAM) 325 (65 Fe) MG tablet Take 1 tablet by mouth 2 times daily  Anastacia Dietrich MD   Prenatal Vit-Fe Fumarate-FA (PRENATAL VITAMIN PO) Take by mouth  Historical Provider, MD       Social History     Tobacco Use    Smoking status: Never    Smokeless tobacco: Never   Vaping Use    Vaping Use: Never used   Substance Use Topics    Alcohol use: No    Drug use: No        Allergies   Allergen Reactions    Cephalexin Other (See Comments)    Amoxicillin Nausea Only and Other (See Comments)   ,   Past Medical History:   Diagnosis Date    Anemia     Herpesviral infection of urogenital system, 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    Mild major depression ([facility] [address] 1/24/2023    Postpartum depression     Vitamin B deficiency     Vitamin D deficiency    ,   Past Surgical History:   Procedure Laterality Date    DOPPLER ECHOCARDIOGRAPHY  7/9/2013    TYMPANOSTOMY TUBE PLACEMENT         PHYSICAL EXAMINATION:  [ INSTRUCTIONS:  \"[x]\" Indicates a positive item  \"[]\" Indicates a negative item  -- DELETE ALL ITEMS NOT EXAMINED]  [x] Alert  [x] Oriented to person/place/time    [x] No apparent distress  [] Toxic appearing    [] Face flushed appearing [] Sclera clear  [] Lips are cyanotic      [x] Breathing appears normal  [] Appears tachypneic      [] Rash on visible skin    [] Cranial Nerves II-XII grossly intact    [] Motor grossly intact in visible upper extremities    [] Motor grossly intact in visible lower extremities    [] Normal Mood  [] Anxious appearing    [] Depressed appearing  [] Confused appearing      [] Poor short term memory  [] Poor long term memory    [] OTHER:      Due to this being a TeleHealth encounter, evaluation of the following organ systems is limited: Vitals/Constitutional/EENT/Resp/CV/GI//MS/Neuro/Skin/Heme-Lymph-Imm. Jag Davila was seen today for follow-up. Diagnoses and all orders for this visit:    Sleep dysfunction with arousal disturbance  -     zolpidem (AMBIEN) 5 MG tablet; Take 1 tablet by mouth nightly as needed for Sleep for up to 30 days. Max Daily Amount: 5 mg    Mild major depression (HCC)  -Continue prozac 20 mg daily    Generalized anxiety disorder  -Continue prozac 20 mg daily    Side effects, adverse effects of the medication prescribed today, as well as treatment plan/ rationale and result expectations have been discussed with the patient who expresses understanding and desires to proceed. Close follow up to evaluate treatment results and for coordination of care. I have reviewed the patient's medical history in detail and updated the computerized patient record. As always, patient is advised that if symptoms worsen in any way they will proceed to the nearest emergency room. Follow up in 2 weeks and as needed. Total time spent for record review, face to face time, and medical decision making was 25 minutes. An  electronic signature was used to authenticate this note. --DES Paiz - CNP on 3/16/2023 at 10:49 AM        Pursuant to the emergency declaration under the [facility] waiver authority and the [employer] Act, this Virtual  Visit was conducted, with patient's consent, to reduce the patient's risk of exposure to COVID-19 and provide continuity of care for an established patient. Services were provided through a video synchronous discussion virtually to substitute for in-person clinic visit.

## 2023-03-29 DIAGNOSIS — G47.9 SLEEP DISTURBANCE: Primary | ICD-10-CM

## 2023-03-29 RX ORDER — ZOLPIDEM TARTRATE 10 MG/1
10 TABLET ORAL NIGHTLY
Qty: 30 TABLET | Refills: 0 | Status: SHIPPED | OUTPATIENT
Start: 2023-03-29 | End: 2023-04-28

## 2023-06-01 ENCOUNTER — TELEPHONE (OUTPATIENT)
Dept: FAMILY MEDICINE CLINIC | Age: 28
End: 2023-06-01

## 2023-06-01 ENCOUNTER — OFFICE VISIT (OUTPATIENT)
Dept: FAMILY MEDICINE CLINIC | Age: 28
End: 2023-06-01
Payer: COMMERCIAL

## 2023-06-01 VITALS
DIASTOLIC BLOOD PRESSURE: 68 MMHG | OXYGEN SATURATION: 98 % | BODY MASS INDEX: 16.99 KG/M2 | WEIGHT: 99 LBS | SYSTOLIC BLOOD PRESSURE: 100 MMHG | HEART RATE: 75 BPM

## 2023-06-01 DIAGNOSIS — F32.0 MILD MAJOR DEPRESSION (HCC): ICD-10-CM

## 2023-06-01 DIAGNOSIS — B37.9 ANTIBIOTIC-INDUCED YEAST INFECTION: ICD-10-CM

## 2023-06-01 DIAGNOSIS — T36.95XA ANTIBIOTIC-INDUCED YEAST INFECTION: ICD-10-CM

## 2023-06-01 DIAGNOSIS — K04.7 DENTAL ABSCESS: Primary | ICD-10-CM

## 2023-06-01 DIAGNOSIS — G47.9 SLEEP DISTURBANCE: ICD-10-CM

## 2023-06-01 PROCEDURE — G8419 CALC BMI OUT NRM PARAM NOF/U: HCPCS | Performed by: NURSE PRACTITIONER

## 2023-06-01 PROCEDURE — 1036F TOBACCO NON-USER: CPT | Performed by: NURSE PRACTITIONER

## 2023-06-01 PROCEDURE — 99214 OFFICE O/P EST MOD 30 MIN: CPT | Performed by: NURSE PRACTITIONER

## 2023-06-01 PROCEDURE — G8427 DOCREV CUR MEDS BY ELIG CLIN: HCPCS | Performed by: NURSE PRACTITIONER

## 2023-06-01 RX ORDER — ZOLPIDEM TARTRATE 10 MG/1
10 TABLET ORAL NIGHTLY PRN
COMMUNITY

## 2023-06-01 RX ORDER — ZOLPIDEM TARTRATE 10 MG/1
10 TABLET ORAL NIGHTLY
Qty: 30 TABLET | Refills: 0 | Status: SHIPPED | OUTPATIENT
Start: 2023-06-01 | End: 2023-07-01

## 2023-06-01 RX ORDER — FLUOXETINE HYDROCHLORIDE 40 MG/1
40 CAPSULE ORAL DAILY
Qty: 30 CAPSULE | Refills: 3 | Status: SHIPPED | OUTPATIENT
Start: 2023-06-01

## 2023-06-01 RX ORDER — ZOLPIDEM TARTRATE 10 MG/1
10 TABLET ORAL NIGHTLY
Qty: 30 TABLET | Refills: 0 | Status: SHIPPED | OUTPATIENT
Start: 2023-08-01 | End: 2023-08-31

## 2023-06-01 RX ORDER — TRAMADOL HYDROCHLORIDE 50 MG/1
50 TABLET ORAL EVERY 8 HOURS PRN
Qty: 42 TABLET | Refills: 0 | Status: SHIPPED | OUTPATIENT
Start: 2023-06-01 | End: 2023-06-15

## 2023-06-01 RX ORDER — CLINDAMYCIN HYDROCHLORIDE 300 MG/1
300 CAPSULE ORAL 3 TIMES DAILY
Qty: 21 CAPSULE | Refills: 0 | Status: SHIPPED | OUTPATIENT
Start: 2023-06-01 | End: 2023-06-08

## 2023-06-01 RX ORDER — ZOLPIDEM TARTRATE 10 MG/1
10 TABLET ORAL NIGHTLY
Qty: 30 TABLET | Refills: 0 | Status: SHIPPED | OUTPATIENT
Start: 2023-07-01 | End: 2023-07-31

## 2023-06-01 RX ORDER — FLUCONAZOLE 150 MG/1
150 TABLET ORAL
Qty: 3 TABLET | Refills: 0 | Status: SHIPPED | OUTPATIENT
Start: 2023-06-01 | End: 2023-06-08

## 2023-06-01 SDOH — ECONOMIC STABILITY: HOUSING INSECURITY
IN THE LAST 12 MONTHS, WAS THERE A TIME WHEN YOU DID NOT HAVE A STEADY PLACE TO SLEEP OR SLEPT IN A SHELTER (INCLUDING NOW)?: NO

## 2023-06-01 SDOH — ECONOMIC STABILITY: FOOD INSECURITY: WITHIN THE PAST 12 MONTHS, YOU WORRIED THAT YOUR FOOD WOULD RUN OUT BEFORE YOU GOT MONEY TO BUY MORE.: NEVER TRUE

## 2023-06-01 SDOH — ECONOMIC STABILITY: INCOME INSECURITY: HOW HARD IS IT FOR YOU TO PAY FOR THE VERY BASICS LIKE FOOD, HOUSING, MEDICAL CARE, AND HEATING?: NOT HARD AT ALL

## 2023-06-01 SDOH — ECONOMIC STABILITY: FOOD INSECURITY: WITHIN THE PAST 12 MONTHS, THE FOOD YOU BOUGHT JUST DIDN'T LAST AND YOU DIDN'T HAVE MONEY TO GET MORE.: NEVER TRUE

## 2023-06-01 ASSESSMENT — PATIENT HEALTH QUESTIONNAIRE - PHQ9
7. TROUBLE CONCENTRATING ON THINGS, SUCH AS READING THE NEWSPAPER OR WATCHING TELEVISION: 0
9. THOUGHTS THAT YOU WOULD BE BETTER OFF DEAD, OR OF HURTING YOURSELF: 0
1. LITTLE INTEREST OR PLEASURE IN DOING THINGS: 1
8. MOVING OR SPEAKING SO SLOWLY THAT OTHER PEOPLE COULD HAVE NOTICED. OR THE OPPOSITE, BEING SO FIGETY OR RESTLESS THAT YOU HAVE BEEN MOVING AROUND A LOT MORE THAN USUAL: 0
SUM OF ALL RESPONSES TO PHQ QUESTIONS 1-9: 6
3. TROUBLE FALLING OR STAYING ASLEEP: 1
SUM OF ALL RESPONSES TO PHQ QUESTIONS 1-9: 6
6. FEELING BAD ABOUT YOURSELF - OR THAT YOU ARE A FAILURE OR HAVE LET YOURSELF OR YOUR FAMILY DOWN: 0
4. FEELING TIRED OR HAVING LITTLE ENERGY: 1
SUM OF ALL RESPONSES TO PHQ QUESTIONS 1-9: 6
SUM OF ALL RESPONSES TO PHQ QUESTIONS 1-9: 6
5. POOR APPETITE OR OVEREATING: 2
SUM OF ALL RESPONSES TO PHQ9 QUESTIONS 1 & 2: 2
10. IF YOU CHECKED OFF ANY PROBLEMS, HOW DIFFICULT HAVE THESE PROBLEMS MADE IT FOR YOU TO DO YOUR WORK, TAKE CARE OF THINGS AT HOME, OR GET ALONG WITH OTHER PEOPLE: 0
2. FEELING DOWN, DEPRESSED OR HOPELESS: 1

## 2023-06-01 ASSESSMENT — ENCOUNTER SYMPTOMS
EYE PAIN: 0
SHORTNESS OF BREATH: 0
HYPERVENTILATION: 0
ABDOMINAL PAIN: 0
SORE THROAT: 0
TROUBLE SWALLOWING: 0
VOMITING: 0
COUGH: 0
SINUS PRESSURE: 1
EYE DISCHARGE: 0
CHOKING: 0
SINUS PAIN: 0
NAUSEA: 0
DIARRHEA: 0
FACIAL SWELLING: 1
WHEEZING: 0
CONSTIPATION: 0
HOURS OF SLEEP PER NIGHT: 4 HOURS
RHINORRHEA: 0
CHEST TIGHTNESS: 0
EYE ITCHING: 0
EYE REDNESS: 0
PHOTOPHOBIA: 0

## 2023-06-01 ASSESSMENT — VISUAL ACUITY: OU: 1

## 2023-06-01 NOTE — TELEPHONE ENCOUNTER
Cough and wheezing since Friday. Fever at home- last dose of Motrin 1745. ABC intact alert and no distress.    Please let this patient know that they are not covered by her insurance.       Thanks,  Monroy Day

## 2023-06-01 NOTE — PROGRESS NOTES
(DIFLUCAN) 150 MG tablet; Take 1 tablet by mouth every 72 hours for 3 doses    Mild major depression (HCC)  -     FLUoxetine (PROZAC) 40 MG capsule; Take 1 capsule by mouth daily    Side effects, adverse effects of the medication prescribed today, as well as treatment plan/ rationale and result expectations have been discussed with the patient who expresses understanding and desires to proceed. Close follow up to evaluate treatment results and for coordination of care. I have reviewed the patient's medical history in detail and updated the computerized patient record. As always, patient is advised that if symptoms worsen in any way they will proceed to the nearest emergency room.      Follow up in 48-72 hours if symptoms persist or worsen and as needed  Follow up with new PCP in 3 months    DES Lamb - CNP

## 2023-06-01 NOTE — TELEPHONE ENCOUNTER
Patient is requesting a Rx for protein drink/shake. Patient would like to be notified so she will only need to make one trip to the pharmacy. Please advise. Callback # [phone];   is not set up

## 2024-03-28 ENCOUNTER — TELEPHONE (OUTPATIENT)
Dept: PRIMARY CARE | Facility: CLINIC | Age: 29
End: 2024-03-28
Payer: COMMERCIAL

## 2024-03-28 NOTE — TELEPHONE ENCOUNTER
Patient phones the office today to schedule an appointment w/ [facility]EE due to anxiety and stress from the loss of her children's father.     It was a very poor connection and patient was yelling and wanted to be seen now and was not very easy to understand. She was advised to go directly to the ER due to the intensity of her stress, she declined stating she needed help now.     Patient was then given the 24/7 Crisis Hotline number for [facility] which she took.     Patient was advised that when she felt able to contact our office back for scheduling purposes for a follow up. Patient can be reached at [phone]. Thank you.     Patient's last visit w/ LEE was 12-9-2022

## 2024-04-02 ENCOUNTER — TELEPHONE (OUTPATIENT)
Dept: PRIMARY CARE | Facility: CLINIC | Age: 29
End: 2024-04-02
Payer: COMMERCIAL

## 2024-04-02 NOTE — PROGRESS NOTES
Writer outreached patient at PCP's request. Per patient, she was looking for an immediate appointment due to her Prozac that she has been on for about an year not managing her anxiety. Experiencing grief associated with the loss of her children's father. Per patient, she is not seeking counseling at this time, just immediate anxiety relief. Writer explained Collaborative Care programing and encouraged patient to reschedule follow up with PCP for her first available. Everr provided patient with [facility] address and information. Patient declines services at this time with everr, can be referred back to services in the future if additional support needs arise.

## 2024-04-18 ENCOUNTER — HOSPITAL ENCOUNTER (EMERGENCY)
Age: 29
Discharge: HOME | End: 2024-04-18
Attending: EMERGENCY MEDICINE
Payer: COMMERCIAL

## 2024-04-18 VITALS
HEIGHT: 64 IN | DIASTOLIC BLOOD PRESSURE: 77 MMHG | OXYGEN SATURATION: 98 % | RESPIRATION RATE: 16 BRPM | WEIGHT: 115 LBS | HEART RATE: 101 BPM | SYSTOLIC BLOOD PRESSURE: 131 MMHG | TEMPERATURE: 98.6 F | BODY MASS INDEX: 19.63 KG/M2

## 2024-04-18 DIAGNOSIS — K08.89 PAIN, DENTAL: Primary | ICD-10-CM

## 2024-04-18 PROCEDURE — 99283 EMERGENCY DEPT VISIT LOW MDM: CPT

## 2024-04-18 RX ORDER — HYDROCODONE BITARTRATE AND ACETAMINOPHEN 5; 325 MG/1; MG/1
1 TABLET ORAL EVERY 4 HOURS PRN
Qty: 10 TABLET | Refills: 0 | Status: SHIPPED | OUTPATIENT
Start: 2024-04-18 | End: 2024-04-21

## 2024-04-18 ASSESSMENT — PAIN SCALES - GENERAL: PAINLEVEL_OUTOF10: 9

## 2024-04-18 ASSESSMENT — PAIN DESCRIPTION - PAIN TYPE: TYPE: ACUTE PAIN

## 2024-04-18 ASSESSMENT — PAIN DESCRIPTION - ORIENTATION: ORIENTATION: LEFT

## 2024-04-18 ASSESSMENT — PAIN - FUNCTIONAL ASSESSMENT: PAIN_FUNCTIONAL_ASSESSMENT: 0-10

## 2024-04-18 ASSESSMENT — PAIN DESCRIPTION - LOCATION: LOCATION: MOUTH

## 2024-04-18 NOTE — DISCHARGE INSTRUCTIONS
TYLENOL/ MOTRIN FOR PAIN.  RETURN FOR NEW OR WORSENING SYMPTOMS.  FOLLOW UP WITH DENTIST.  
DISPLAY PLAN FREE TEXT

## 2024-04-18 NOTE — ED TRIAGE NOTES
Patient presents to ED with c/o left side dental pain - reports she is scheduled for a root canal next week but pain is to severe. She reports motrin is what they gave her and it is not working

## 2024-04-18 NOTE — ED PROVIDER NOTES
6:33 PM EDT  [facility] ED  EMERGENCY DEPARTMENT ENCOUNTER      Pt Name: Mignon Alves  MRN: [id number]  Birthdate 1995  Date of evaluation: 4/18/2024  Provider: Sen Villanueva MD    CHIEF COMPLAINT       Chief Complaint   Patient presents with    Dental Pain     Patient states she has appt Wednesday for root canal and cannot tolerate the pain         HISTORY OF PRESENT ILLNESS   (Location/Symptom, Timing/Onset, Context/Setting, Quality, Duration, Modifying Factors, Severity)  Note limiting factors.   29-year-old female presenting with left lower molar pain.  She has a root canal scheduled for Wednesday but has been unable to tolerate the pain.  She is using Orajel and Motrin without improvement.        Nursing Notes were reviewed.    REVIEW OF SYSTEMS    (2-9 systems for level 4, 10 or more for level 5)     Review of Systems   HENT:  Positive for dental problem.    All other systems reviewed and are negative.      Except as noted above the remainder of the review of systems was reviewed and negative.       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    Mild major depression (HCC) 1/24/2023    Postpartum depression     Vitamin B deficiency     Vitamin D deficiency          SURGICAL HISTORY       Past Surgical History:   Procedure Laterality Date    DOPPLER ECHOCARDIOGRAPHY  7/9/2013    TYMPANOSTOMY TUBE PLACEMENT           CURRENT MEDICATIONS       Previous Medications    B COMPLEX VITAMINS (RA B-COMPLEX) TABS    take 1 tablet by mouth once daily    B COMPLEX VITAMINS (VITAMIN B COMPLEX PO)    Take by mouth    CHOLECALCIFEROL (VITAMIN D3) 50 MCG (2000 UT) TABS    take 1 tablet by mouth once daily    DOXEPIN (SINEQUAN) 10 MG CAPSULE    Take 1 capsule by mouth nightly    FERROUS SULFATE (RENEE-VIKRAM) 325 (65 FE) MG TABLET    Take 1 tablet by mouth 2 times daily    FLUOXETINE (PROZAC) 10 MG CAPSULE    Take 1

## 2025-01-29 ENCOUNTER — HOSPITAL ENCOUNTER (EMERGENCY)
Age: 30
Discharge: HOME | End: 2025-01-29
Payer: COMMERCIAL

## 2025-01-29 VITALS
DIASTOLIC BLOOD PRESSURE: 52 MMHG | HEART RATE: 91 BPM | OXYGEN SATURATION: 97 % | SYSTOLIC BLOOD PRESSURE: 121 MMHG | TEMPERATURE: 97.8 F | RESPIRATION RATE: 16 BRPM

## 2025-01-29 DIAGNOSIS — F41.1 ANXIETY REACTION: ICD-10-CM

## 2025-01-29 DIAGNOSIS — R51.9 NONINTRACTABLE HEADACHE, UNSPECIFIED CHRONICITY PATTERN, UNSPECIFIED HEADACHE TYPE: Primary | ICD-10-CM

## 2025-01-29 DIAGNOSIS — Z87.39 HISTORY OF TMJ DISORDER: ICD-10-CM

## 2025-01-29 PROCEDURE — 96372 THER/PROPH/DIAG INJ SC/IM: CPT

## 2025-01-29 PROCEDURE — 6360000002 HC RX W HCPCS

## 2025-01-29 PROCEDURE — 99283 EMERGENCY DEPT VISIT LOW MDM: CPT

## 2025-01-29 PROCEDURE — 6370000000 HC RX 637 (ALT 250 FOR IP)

## 2025-01-29 RX ORDER — BUTALBITAL, ACETAMINOPHEN AND CAFFEINE 300; 40; 50 MG/1; MG/1; MG/1
1 CAPSULE ORAL ONCE
Status: COMPLETED | OUTPATIENT
Start: 2025-01-29 | End: 2025-01-29

## 2025-01-29 RX ORDER — CYCLOBENZAPRINE HCL 10 MG
10 TABLET ORAL 2 TIMES DAILY PRN
Qty: 20 TABLET | Refills: 0 | Status: SHIPPED | OUTPATIENT
Start: 2025-01-29 | End: 2025-02-08

## 2025-01-29 RX ORDER — KETOROLAC TROMETHAMINE 30 MG/ML
30 INJECTION, SOLUTION INTRAMUSCULAR; INTRAVENOUS ONCE
Status: COMPLETED | OUTPATIENT
Start: 2025-01-29 | End: 2025-01-29

## 2025-01-29 RX ORDER — KETOROLAC TROMETHAMINE 10 MG/1
10 TABLET, FILM COATED ORAL EVERY 6 HOURS PRN
Qty: 20 TABLET | Refills: 0 | Status: SHIPPED | OUTPATIENT
Start: 2025-01-29

## 2025-01-29 RX ADMIN — KETOROLAC TROMETHAMINE 30 MG: 30 INJECTION, SOLUTION INTRAMUSCULAR at 16:39

## 2025-01-29 RX ADMIN — BUTALBITA,ACETAMINOPHEN AND CAFFEINE 1 CAPSULE: 50; 300; 40 CAPSULE ORAL at 16:39

## 2025-01-29 ASSESSMENT — VISUAL ACUITY: OU: 1

## 2025-01-29 ASSESSMENT — ENCOUNTER SYMPTOMS
COUGH: 0
BACK PAIN: 0
VOMITING: 0
NAUSEA: 0
SORE THROAT: 0
SHORTNESS OF BREATH: 0
DIARRHEA: 0
ABDOMINAL PAIN: 0

## 2025-01-29 ASSESSMENT — LIFESTYLE VARIABLES
HOW OFTEN DO YOU HAVE A DRINK CONTAINING ALCOHOL: NEVER
HOW MANY STANDARD DRINKS CONTAINING ALCOHOL DO YOU HAVE ON A TYPICAL DAY: PATIENT DOES NOT DRINK

## 2025-01-29 ASSESSMENT — PAIN SCALES - GENERAL: PAINLEVEL_OUTOF10: 8

## 2025-01-29 NOTE — ED TRIAGE NOTES
Pt a/o x 3 skin pink w/d resp non labored. Pt c/o chronic head and neck pain starting last night. Pt states it hurts into forehead,temples,jaw and neck area. Pt in nad.

## 2025-01-29 NOTE — DISCHARGE INSTRUCTIONS
Follow-up with your primary care doctor.  Keep a headache diary.  Return to emergency department for any new or worsening symptoms.

## 2025-01-30 ENCOUNTER — APPOINTMENT (OUTPATIENT)
Dept: PRIMARY CARE | Facility: CLINIC | Age: 30
End: 2025-01-30
Payer: COMMERCIAL

## 2025-01-31 ENCOUNTER — OFFICE VISIT (OUTPATIENT)
Dept: FAMILY MEDICINE CLINIC | Age: 30
End: 2025-01-31
Payer: COMMERCIAL

## 2025-01-31 VITALS
WEIGHT: 113.4 LBS | TEMPERATURE: 97.5 F | DIASTOLIC BLOOD PRESSURE: 72 MMHG | OXYGEN SATURATION: 100 % | HEART RATE: 78 BPM | HEIGHT: 64 IN | SYSTOLIC BLOOD PRESSURE: 106 MMHG | BODY MASS INDEX: 19.36 KG/M2

## 2025-01-31 DIAGNOSIS — G43.E19 INTRACTABLE CHRONIC MIGRAINE WITH AURA AND WITHOUT STATUS MIGRAINOSUS: Primary | ICD-10-CM

## 2025-01-31 PROBLEM — G43.709 CHRONIC MIGRAINE WITHOUT AURA WITHOUT STATUS MIGRAINOSUS, NOT INTRACTABLE: Status: ACTIVE | Noted: 2025-01-31

## 2025-01-31 PROCEDURE — G8427 DOCREV CUR MEDS BY ELIG CLIN: HCPCS | Performed by: NURSE PRACTITIONER

## 2025-01-31 PROCEDURE — 99214 OFFICE O/P EST MOD 30 MIN: CPT | Performed by: NURSE PRACTITIONER

## 2025-01-31 PROCEDURE — 1036F TOBACCO NON-USER: CPT | Performed by: NURSE PRACTITIONER

## 2025-01-31 PROCEDURE — G8420 CALC BMI NORM PARAMETERS: HCPCS | Performed by: NURSE PRACTITIONER

## 2025-01-31 RX ORDER — RIZATRIPTAN BENZOATE 10 MG/1
10 TABLET ORAL PRN
Qty: 9 TABLET | Refills: 3 | Status: SHIPPED | OUTPATIENT
Start: 2025-01-31

## 2025-01-31 RX ORDER — ATOGEPANT 30 MG/1
30 TABLET ORAL DAILY
Qty: 30 TABLET | Refills: 2 | Status: SHIPPED | OUTPATIENT
Start: 2025-01-31

## 2025-01-31 SDOH — ECONOMIC STABILITY: FOOD INSECURITY: WITHIN THE PAST 12 MONTHS, THE FOOD YOU BOUGHT JUST DIDN'T LAST AND YOU DIDN'T HAVE MONEY TO GET MORE.: NEVER TRUE

## 2025-01-31 SDOH — ECONOMIC STABILITY: FOOD INSECURITY: WITHIN THE PAST 12 MONTHS, YOU WORRIED THAT YOUR FOOD WOULD RUN OUT BEFORE YOU GOT MONEY TO BUY MORE.: NEVER TRUE

## 2025-01-31 ASSESSMENT — ENCOUNTER SYMPTOMS
COLOR CHANGE: 0
SHORTNESS OF BREATH: 0
CHEST TIGHTNESS: 0
SINUS PRESSURE: 0
COUGH: 0
SINUS PAIN: 0

## 2025-01-31 ASSESSMENT — PATIENT HEALTH QUESTIONNAIRE - PHQ9
3. TROUBLE FALLING OR STAYING ASLEEP: NOT AT ALL
SUM OF ALL RESPONSES TO PHQ QUESTIONS 1-9: 0
SUM OF ALL RESPONSES TO PHQ QUESTIONS 1-9: 0
9. THOUGHTS THAT YOU WOULD BE BETTER OFF DEAD, OR OF HURTING YOURSELF: NOT AT ALL
SUM OF ALL RESPONSES TO PHQ QUESTIONS 1-9: 0
5. POOR APPETITE OR OVEREATING: NOT AT ALL
1. LITTLE INTEREST OR PLEASURE IN DOING THINGS: NOT AT ALL
6. FEELING BAD ABOUT YOURSELF - OR THAT YOU ARE A FAILURE OR HAVE LET YOURSELF OR YOUR FAMILY DOWN: NOT AT ALL
10. IF YOU CHECKED OFF ANY PROBLEMS, HOW DIFFICULT HAVE THESE PROBLEMS MADE IT FOR YOU TO DO YOUR WORK, TAKE CARE OF THINGS AT HOME, OR GET ALONG WITH OTHER PEOPLE: NOT DIFFICULT AT ALL
2. FEELING DOWN, DEPRESSED OR HOPELESS: NOT AT ALL
4. FEELING TIRED OR HAVING LITTLE ENERGY: NOT AT ALL
7. TROUBLE CONCENTRATING ON THINGS, SUCH AS READING THE NEWSPAPER OR WATCHING TELEVISION: NOT AT ALL
SUM OF ALL RESPONSES TO PHQ QUESTIONS 1-9: 0
8. MOVING OR SPEAKING SO SLOWLY THAT OTHER PEOPLE COULD HAVE NOTICED. OR THE OPPOSITE, BEING SO FIGETY OR RESTLESS THAT YOU HAVE BEEN MOVING AROUND A LOT MORE THAN USUAL: NOT AT ALL
SUM OF ALL RESPONSES TO PHQ9 QUESTIONS 1 & 2: 0

## 2025-01-31 NOTE — PROGRESS NOTES
Mignon Alves (: 1995) is a 29 y.o. female, Established patient, who presents today for:    Chief Complaint   Patient presents with    New Patient     Establishing care, pt has been dealing with migraines, she is currently still dealing with one.          Subjective     HPI:  HPI     Patient reports history of migraines for years.  She has never been prescribed anything for migraine prevention or treatment.  She has tried to use over-the-counter ibuprofen, Tylenol, Excedrin but those do not relief her headaches.  She does have some nausea and at times vision disturbances with headaches  She was seen in the ER couple days ago for headaches and was prescribed Toradol and given Flexeril  Headaches are bilateral temporal and parietal area radiating around to occipital area and down into her neck muscles        Review of Systems   HENT:  Negative for congestion, ear pain, sinus pressure and sinus pain.    Respiratory:  Negative for cough, chest tightness and shortness of breath.    Musculoskeletal:  Negative for arthralgias and myalgias.   Skin:  Negative for color change and rash.   Neurological:  Positive for headaches. Negative for dizziness, weakness, light-headedness and numbness.   Hematological:  Negative for adenopathy.   Psychiatric/Behavioral:  Negative for sleep disturbance. The patient is not nervous/anxious.         Past Medical History:   Diagnosis Date    Anemia     Herpesviral infection of urogenital system, unspecified 2018    Mild major depression (HCC) 2023    Postpartum depression     Vitamin B deficiency     Vitamin D deficiency       Social History     Socioeconomic History    Marital status: Single     Spouse name: Not on file    Number of children: Not on file    Years of education: Not on file    Highest education level: Not on file   Occupational History    Not on file   Tobacco Use    Smoking status: Never    Smokeless tobacco: Never   Vaping Use    Vaping status:

## 2025-02-03 ENCOUNTER — TELEPHONE (OUTPATIENT)
Dept: FAMILY MEDICINE CLINIC | Age: 30
End: 2025-02-03

## 2025-02-03 DIAGNOSIS — R51.9 ACUTE NONINTRACTABLE HEADACHE, UNSPECIFIED HEADACHE TYPE: Primary | ICD-10-CM

## 2025-02-03 RX ORDER — TOPIRAMATE 50 MG/1
TABLET, FILM COATED ORAL
Qty: 46 TABLET | Refills: 0 | Status: SHIPPED | OUTPATIENT
Start: 2025-02-03 | End: 2025-03-05

## 2025-02-03 NOTE — TELEPHONE ENCOUNTER
Pt called in stating insurance will not cover migraine medication and would like something else sent in for it.

## 2025-02-07 ENCOUNTER — OFFICE VISIT (OUTPATIENT)
Dept: FAMILY MEDICINE CLINIC | Age: 30
End: 2025-02-07
Payer: COMMERCIAL

## 2025-02-07 ENCOUNTER — TELEPHONE (OUTPATIENT)
Dept: INTERNAL MEDICINE | Age: 30
End: 2025-02-07

## 2025-02-07 VITALS
WEIGHT: 113 LBS | HEIGHT: 64 IN | BODY MASS INDEX: 19.29 KG/M2 | SYSTOLIC BLOOD PRESSURE: 120 MMHG | TEMPERATURE: 97.2 F | OXYGEN SATURATION: 99 % | DIASTOLIC BLOOD PRESSURE: 64 MMHG | HEART RATE: 121 BPM

## 2025-02-07 DIAGNOSIS — G43.E19 INTRACTABLE CHRONIC MIGRAINE WITH AURA AND WITHOUT STATUS MIGRAINOSUS: Primary | ICD-10-CM

## 2025-02-07 PROCEDURE — 99214 OFFICE O/P EST MOD 30 MIN: CPT | Performed by: NURSE PRACTITIONER

## 2025-02-07 PROCEDURE — 1036F TOBACCO NON-USER: CPT | Performed by: NURSE PRACTITIONER

## 2025-02-07 PROCEDURE — G8427 DOCREV CUR MEDS BY ELIG CLIN: HCPCS | Performed by: NURSE PRACTITIONER

## 2025-02-07 PROCEDURE — G8420 CALC BMI NORM PARAMETERS: HCPCS | Performed by: NURSE PRACTITIONER

## 2025-02-07 RX ORDER — ATOGEPANT 30 MG/1
30 TABLET ORAL DAILY
Qty: 30 TABLET | Refills: 2 | Status: SHIPPED | OUTPATIENT
Start: 2025-02-07

## 2025-02-07 NOTE — PROGRESS NOTES
Atogepant (QULIPTA) 30 MG TABS Take 30 mg by mouth daily 2/7/25  Yes Adry Spence APRN - CNP   topiramate (TOPAMAX) 50 MG tablet Take 0.5 tablets by mouth daily for 7 days, THEN 0.5 tablets 2 times daily for 7 days, THEN 1 tablet 2 times daily for 16 days. 2/3/25 3/5/25 Yes Adry Spence APRN - CNP   ketorolac (TORADOL) 10 MG tablet Take 1 tablet by mouth every 6 hours as needed for Pain 1/29/25  Yes Karyna Jasso APRN - CNP   cyclobenzaprine (FLEXERIL) 10 MG tablet Take 1 tablet by mouth 2 times daily as needed for Muscle spasms 1/29/25 2/8/25 Yes Karyna Jasso APRN - CNP   zolpidem (AMBIEN) 10 MG tablet Take 1 tablet by mouth nightly as needed for Sleep.   Yes Provider, MD Marvin               Objective     Vitals:  Vitals:    02/07/25 1418   BP: 120/64   Pulse: (!) 121   Temp: 97.2 °F (36.2 °C)   TempSrc: Temporal   SpO2: 99%   Weight: 51.3 kg (113 lb)   Height: 1.626 m (5' 4\")             Physical Exam  Eyes:      General: No visual field deficit.     Extraocular Movements: Extraocular movements intact.   Neurological:      General: No focal deficit present.      Mental Status: She is alert and oriented to person, place, and time.      Cranial Nerves: Cranial nerves 2-12 are intact. No facial asymmetry.      Sensory: Sensation is intact.      Comments: Cranial nerves II through X intact    PERRLA          Physical Exam  Vitals reviewed.   Constitutional:       General: She is not in acute distress.     Appearance: Normal appearance. She is not ill-appearing or toxic-appearing.   HENT:      Head: Normocephalic.      Right Ear: Tympanic membrane normal.      Left Ear: Tympanic membrane normal.      Nose: Nose normal.      Mouth/Throat:      Mouth: Mucous membranes are moist.   Eyes:      Conjunctiva/sclera: Conjunctivae normal.   Cardiovascular:      Rate and Rhythm: Normal rate and regular rhythm.      Heart sounds: No murmur heard.  Pulmonary:      Effort: Pulmonary effort is normal.      Breath

## 2025-02-07 NOTE — TELEPHONE ENCOUNTER
Received page this evening regarding migraine medication that was prescribed today not being covered by insurance. Attempted to call patient back but VM box is not set up to receive messages. Will forward to her PCP.     Julieta Kohli, DO

## 2025-02-12 ENCOUNTER — OFFICE VISIT (OUTPATIENT)
Dept: FAMILY MEDICINE CLINIC | Age: 30
End: 2025-02-12
Payer: COMMERCIAL

## 2025-02-12 ENCOUNTER — TELEPHONE (OUTPATIENT)
Dept: FAMILY MEDICINE CLINIC | Age: 30
End: 2025-02-12

## 2025-02-12 VITALS
DIASTOLIC BLOOD PRESSURE: 78 MMHG | SYSTOLIC BLOOD PRESSURE: 116 MMHG | TEMPERATURE: 97.1 F | HEART RATE: 115 BPM | OXYGEN SATURATION: 98 %

## 2025-02-12 DIAGNOSIS — G43.709 CHRONIC MIGRAINE WITHOUT AURA WITHOUT STATUS MIGRAINOSUS, NOT INTRACTABLE: Primary | ICD-10-CM

## 2025-02-12 PROCEDURE — G8420 CALC BMI NORM PARAMETERS: HCPCS | Performed by: NURSE PRACTITIONER

## 2025-02-12 PROCEDURE — 99214 OFFICE O/P EST MOD 30 MIN: CPT | Performed by: NURSE PRACTITIONER

## 2025-02-12 PROCEDURE — 1036F TOBACCO NON-USER: CPT | Performed by: NURSE PRACTITIONER

## 2025-02-12 PROCEDURE — G8427 DOCREV CUR MEDS BY ELIG CLIN: HCPCS | Performed by: NURSE PRACTITIONER

## 2025-02-12 RX ORDER — TOPIRAMATE 50 MG/1
50 TABLET, FILM COATED ORAL 2 TIMES DAILY
Qty: 60 TABLET | Refills: 0 | Status: SHIPPED | OUTPATIENT
Start: 2025-02-12 | End: 2025-03-14

## 2025-02-12 RX ORDER — IBUPROFEN 800 MG/1
800 TABLET, FILM COATED ORAL
Qty: 90 TABLET | Refills: 2 | Status: SHIPPED | OUTPATIENT
Start: 2025-02-12

## 2025-02-12 RX ORDER — NARATRIPTAN 2.5 MG/1
TABLET ORAL
Qty: 9 TABLET | Refills: 3 | Status: SHIPPED | OUTPATIENT
Start: 2025-02-12

## 2025-02-12 ASSESSMENT — ENCOUNTER SYMPTOMS
SINUS PAIN: 0
COUGH: 0
COLOR CHANGE: 0
CHEST TIGHTNESS: 0
SINUS PRESSURE: 0
SHORTNESS OF BREATH: 0

## 2025-02-12 NOTE — PROGRESS NOTES
Mignon Alves (: 1995) is a 29 y.o. female, Established patient, who presents today for:    Chief Complaint   Patient presents with    Migraine     Pt is still having issues with headaches, having issues sleeping, insurance is not wanting to cover most medications           Subjective     HPI:  Migraine   Pertinent negatives include no coughing, dizziness, ear pain, numbness, sinus pressure or weakness.        Patient reports history of migraines for years.  She has been taking topiramate for headaches and consistently increasing dose.  She is at 50 mg once a day currently  She was given Maxalt and used that with success, it did resolve her headaches however she is getting daily headaches and could not treat once her monthly allowance of the Maxalt was used up.    She was then prescribed Aimovig because that was supposedly covered under her insurance but once the prior Auth was done insurance denied it  Patient did talk to her insurance and they told her she must try amitriptyline and Amerge    She has tried to use over-the-counter ibuprofen, Tylenol, Excedrin but those do not relief her headaches.  She does have some nausea and at times vision disturbances with headaches  She was seen in the ER couple weeks ago for headaches and was prescribed Toradol and given Flexeril, the Flexeril has not been helping with her neck pain.  She is finished with the Toradol  Headaches are bilateral temporal and parietal area radiating around to occipital area and down into her neck muscles  Patient is quite frustrated with medication situation and that she is not able to get a medication approved to treat her headaches      Review of Systems   HENT:  Negative for congestion, ear pain, sinus pressure and sinus pain.    Respiratory:  Negative for cough, chest tightness and shortness of breath.    Musculoskeletal:  Negative for arthralgias and myalgias.   Skin:  Negative for color change and rash.   Neurological:

## 2025-02-26 ENCOUNTER — OFFICE VISIT (OUTPATIENT)
Dept: FAMILY MEDICINE CLINIC | Age: 30
End: 2025-02-26
Payer: COMMERCIAL

## 2025-02-26 VITALS
BODY MASS INDEX: 18.71 KG/M2 | WEIGHT: 109 LBS | DIASTOLIC BLOOD PRESSURE: 72 MMHG | SYSTOLIC BLOOD PRESSURE: 114 MMHG | HEART RATE: 98 BPM | OXYGEN SATURATION: 100 % | TEMPERATURE: 97.5 F

## 2025-02-26 DIAGNOSIS — G43.E19 INTRACTABLE CHRONIC MIGRAINE WITH AURA AND WITHOUT STATUS MIGRAINOSUS: ICD-10-CM

## 2025-02-26 PROCEDURE — 1036F TOBACCO NON-USER: CPT | Performed by: NURSE PRACTITIONER

## 2025-02-26 PROCEDURE — 99214 OFFICE O/P EST MOD 30 MIN: CPT | Performed by: NURSE PRACTITIONER

## 2025-02-26 PROCEDURE — G8420 CALC BMI NORM PARAMETERS: HCPCS | Performed by: NURSE PRACTITIONER

## 2025-02-26 PROCEDURE — G8427 DOCREV CUR MEDS BY ELIG CLIN: HCPCS | Performed by: NURSE PRACTITIONER

## 2025-02-26 RX ORDER — RIZATRIPTAN BENZOATE 10 MG/1
10 TABLET ORAL PRN
Qty: 9 TABLET | Refills: 3 | Status: SHIPPED | OUTPATIENT
Start: 2025-02-26

## 2025-02-26 RX ORDER — ATOGEPANT 30 MG/1
30 TABLET ORAL DAILY
Qty: 30 TABLET | Refills: 2 | Status: SHIPPED | OUTPATIENT
Start: 2025-02-26

## 2025-02-26 ASSESSMENT — ENCOUNTER SYMPTOMS
COUGH: 0
SHORTNESS OF BREATH: 0
SINUS PAIN: 0
COLOR CHANGE: 0
CHEST TIGHTNESS: 0
SINUS PRESSURE: 0

## 2025-02-26 NOTE — PROGRESS NOTES
Mignon Alves (: 1995) is a 29 y.o. female, Established patient, who presents today for:    Chief Complaint   Patient presents with    Discuss Medications     Pt is on Topamax, but is now having side effects which are fingers tingling and loss of appetite          Subjective     HPI:  Patient reports she is having side effects of Topamax, including wt loss, decreased appetite and numbness and tingling into her fingertips  Amerge, not effective  Maxalt worked better for her  Patient reports history of migraines for years.  She has been taking topiramate for headaches and consistently increasing dose.  She is at 50 mg twice daily currently  She was given Maxalt and used that with success, it did resolve her headaches however she is getting daily headaches and could not treat once her monthly allowance of the Maxalt was used up.    She was then prescribed Aimovig because that was supposedly covered under her insurance but once the prior Auth was done insurance denied it  Patient did talk to her insurance and they told her she must try amitriptyline and Amerge, which she is taking the amitriptyline at at bedtime and she did try the Amerge that was not effective  Patient needs to be on a daily prophylactic migraine medications such as Qulipta    She has tried to use over-the-counter ibuprofen, Tylenol, Excedrin but those do not relief her headaches.  She does have some nausea and at times vision disturbances with headaches  She was seen in the ER couple weeks ago for headaches and was prescribed Toradol and given Flexeril, the Flexeril has not been helping with her neck pain.    Headaches are bilateral temporal and parietal area radiating around to occipital area and down into her neck muscles  Patient is quite frustrated with medication situation and that she is not able to get a medication approved to treat her headaches      Review of Systems   HENT:  Negative for congestion, ear pain, sinus pressure

## 2025-03-04 DIAGNOSIS — G43.709 CHRONIC MIGRAINE WITHOUT AURA WITHOUT STATUS MIGRAINOSUS, NOT INTRACTABLE: ICD-10-CM

## 2025-03-04 NOTE — TELEPHONE ENCOUNTER
Pt is requesting refill for topiramate (TOPAMAX) 50 MG tablet to be sent to [facility] in [place]. States she is completely out. Please advise.    LOV: 2/26/2025  No future appts    Ph: [phone]

## 2025-03-04 NOTE — TELEPHONE ENCOUNTER
Comments:     Last Office Visit (last PCP visit):   2/26/2025    Next Visit Date:  No future appointments.    **If hasn't been seen in over a year OR hasn't followed up according to last diabetes/ADHD visit, make appointment for patient before sending refill to provider.    Rx requested:  Requested Prescriptions     Pending Prescriptions Disp Refills    topiramate (TOPAMAX) 50 MG tablet 60 tablet 0     Sig: Take 1 tablet by mouth 2 times daily

## 2025-03-05 RX ORDER — TOPIRAMATE 50 MG/1
50 TABLET, FILM COATED ORAL 2 TIMES DAILY
Qty: 60 TABLET | Refills: 0 | Status: SHIPPED | OUTPATIENT
Start: 2025-03-05 | End: 2025-04-04

## 2025-03-10 ENCOUNTER — APPOINTMENT (OUTPATIENT)
Dept: PRIMARY CARE | Facility: CLINIC | Age: 30
End: 2025-03-10
Payer: COMMERCIAL

## 2025-04-09 ENCOUNTER — APPOINTMENT (OUTPATIENT)
Dept: PRIMARY CARE | Facility: CLINIC | Age: 30
End: 2025-04-09
Payer: COMMERCIAL

## 2025-04-11 ENCOUNTER — OFFICE VISIT (OUTPATIENT)
Dept: FAMILY MEDICINE CLINIC | Age: 30
End: 2025-04-11
Payer: COMMERCIAL

## 2025-04-11 VITALS
HEART RATE: 78 BPM | SYSTOLIC BLOOD PRESSURE: 110 MMHG | TEMPERATURE: 98 F | BODY MASS INDEX: 18.61 KG/M2 | OXYGEN SATURATION: 98 % | WEIGHT: 109 LBS | DIASTOLIC BLOOD PRESSURE: 68 MMHG | HEIGHT: 64 IN

## 2025-04-11 DIAGNOSIS — S03.40XD SPRAIN OF TEMPOROMANDIBULAR JOINT, SUBSEQUENT ENCOUNTER: Primary | ICD-10-CM

## 2025-04-11 DIAGNOSIS — F33.1 MODERATE EPISODE OF RECURRENT MAJOR DEPRESSIVE DISORDER (HCC): ICD-10-CM

## 2025-04-11 PROCEDURE — G8427 DOCREV CUR MEDS BY ELIG CLIN: HCPCS | Performed by: NURSE PRACTITIONER

## 2025-04-11 PROCEDURE — 99214 OFFICE O/P EST MOD 30 MIN: CPT | Performed by: NURSE PRACTITIONER

## 2025-04-11 PROCEDURE — 1036F TOBACCO NON-USER: CPT | Performed by: NURSE PRACTITIONER

## 2025-04-11 PROCEDURE — G8420 CALC BMI NORM PARAMETERS: HCPCS | Performed by: NURSE PRACTITIONER

## 2025-04-11 RX ORDER — PREDNISONE 20 MG/1
TABLET ORAL
Qty: 21 TABLET | Refills: 0 | Status: SHIPPED | OUTPATIENT
Start: 2025-04-11 | End: 2025-04-20

## 2025-04-11 RX ORDER — ESCITALOPRAM OXALATE 20 MG/1
20 TABLET ORAL DAILY
Qty: 30 TABLET | Refills: 5 | Status: SHIPPED | OUTPATIENT
Start: 2025-04-11

## 2025-04-11 ASSESSMENT — ENCOUNTER SYMPTOMS
NAUSEA: 0
EYE REDNESS: 0
CONSTIPATION: 0
EYE DISCHARGE: 0
SINUS PRESSURE: 0
DIARRHEA: 0
SINUS PAIN: 0
ABDOMINAL PAIN: 0
SHORTNESS OF BREATH: 0
COUGH: 0
CHEST TIGHTNESS: 0
COLOR CHANGE: 0
WHEEZING: 0
VOMITING: 0

## 2025-04-11 NOTE — PROGRESS NOTES
Mignon Alves (: 1995) is a 30 y.o. female, Established patient, who presents today for:    Chief Complaint   Patient presents with    Follow-up     Patient is here with c/o TMJ patients states that ibuprofen has not helped her TMJ, states pain goes behind her ears.  Worse at night.          Subjective     HPI:  History of Present Illness  The patient is a 30-year-old female who presents today with TMJ symptoms bilaterally.    She reports experiencing bilateral temporomandibular joint (TMJ) pain, which she describes as severe and localized in front of the ear and along the jawline, worse on the left side. The pain is so intense that it disrupts her sleep.  She also reports a shooting pain when lying on the back of her head, which she suspects may be nerve-related.  She saw a chiropractor in the past but it is been a couple years now and he did trigger point injections into her jaw but she has discontinued chiropractic treatment and does not regularly see a dentist. She has been self-medicating with ibuprofen 800 mg 3 times daily, but this regimen provides minimal relief. She has a history of TMJ and has previously received trigger point injections from a chiropractor.    Patient also reports moderate to severe depression in which she has difficulty getting out of bed and lacks motivation.  She has been prescribed Prozac but discontinued its use due to severe heartburn. She has previously tried Zoloft, which was ineffective, and has not yet tried Lexapro. She reports a lack of motivation and spends most of her time in bed. She is currently not taking amitriptyline.  She denies suicidal ideation    She continues to take Topamax and does not require a refill at this time. She has not taken Ambien for some time as her doctor has stopped prescribing it. She has been using Maxalt for her headaches as needed            Results         Review of Systems   Constitutional:  Negative for chills and fever.

## 2025-06-06 DIAGNOSIS — G43.709 CHRONIC MIGRAINE WITHOUT AURA WITHOUT STATUS MIGRAINOSUS, NOT INTRACTABLE: ICD-10-CM

## 2025-06-06 DIAGNOSIS — G43.E19 INTRACTABLE CHRONIC MIGRAINE WITH AURA AND WITHOUT STATUS MIGRAINOSUS: ICD-10-CM

## 2025-06-06 RX ORDER — RIZATRIPTAN BENZOATE 10 MG/1
10 TABLET ORAL PRN
Qty: 9 TABLET | Refills: 3 | Status: SHIPPED | OUTPATIENT
Start: 2025-06-06

## 2025-06-06 RX ORDER — TOPIRAMATE 50 MG/1
50 TABLET, FILM COATED ORAL 2 TIMES DAILY
Qty: 60 TABLET | Refills: 0 | Status: SHIPPED | OUTPATIENT
Start: 2025-06-06 | End: 2025-07-06

## 2025-06-06 NOTE — TELEPHONE ENCOUNTER
Detail Level: Detailed Pt requesting refills for rizatriptan (MAXALT) 10 MG tablet, and topiramate (TOPAMAX) 50 MG tablet to be sent to [facility] in [place]. States she is completely out. Please advise.    LOV: 4/11/2025  No future appts    Ph: [phone]

## 2025-06-06 NOTE — TELEPHONE ENCOUNTER
Comments:     Last Office Visit (last PCP visit):   4/11/2025    Next Visit Date:  No future appointments.    **If hasn't been seen in over a year OR hasn't followed up according to last diabetes/ADHD visit, make appointment for patient before sending refill to provider.    Rx requested:  Requested Prescriptions     Pending Prescriptions Disp Refills    rizatriptan (MAXALT) 10 MG tablet 9 tablet 3     Sig: Take 1 tablet by mouth as needed for Migraine (at onset of headache) May repeat in 2 hours if needed    topiramate (TOPAMAX) 50 MG tablet 60 tablet 0     Sig: Take 1 tablet by mouth 2 times daily

## 2025-07-17 ENCOUNTER — TELEPHONE (OUTPATIENT)
Dept: FAMILY MEDICINE CLINIC | Age: 30
End: 2025-07-17

## 2025-07-17 DIAGNOSIS — G43.E19 INTRACTABLE CHRONIC MIGRAINE WITH AURA AND WITHOUT STATUS MIGRAINOSUS: ICD-10-CM

## 2025-07-17 RX ORDER — RIZATRIPTAN BENZOATE 10 MG/1
10 TABLET ORAL PRN
Qty: 9 TABLET | Refills: 3 | Status: SHIPPED | OUTPATIENT
Start: 2025-07-17

## 2025-07-17 NOTE — TELEPHONE ENCOUNTER
Patient stopped into our office for help updating mychart info and help logging into her account

## 2025-08-01 ENCOUNTER — OFFICE VISIT (OUTPATIENT)
Dept: FAMILY MEDICINE CLINIC | Age: 30
End: 2025-08-01
Payer: COMMERCIAL

## 2025-08-01 VITALS
HEART RATE: 69 BPM | TEMPERATURE: 98.4 F | OXYGEN SATURATION: 95 % | WEIGHT: 112.6 LBS | BODY MASS INDEX: 19.32 KG/M2 | DIASTOLIC BLOOD PRESSURE: 68 MMHG | SYSTOLIC BLOOD PRESSURE: 112 MMHG

## 2025-08-01 DIAGNOSIS — N92.0 MENORRHAGIA WITH REGULAR CYCLE: Primary | ICD-10-CM

## 2025-08-01 PROCEDURE — G8420 CALC BMI NORM PARAMETERS: HCPCS | Performed by: NURSE PRACTITIONER

## 2025-08-01 PROCEDURE — G8427 DOCREV CUR MEDS BY ELIG CLIN: HCPCS | Performed by: NURSE PRACTITIONER

## 2025-08-01 PROCEDURE — 99213 OFFICE O/P EST LOW 20 MIN: CPT | Performed by: NURSE PRACTITIONER

## 2025-08-01 PROCEDURE — 1036F TOBACCO NON-USER: CPT | Performed by: NURSE PRACTITIONER

## 2025-08-01 ASSESSMENT — ENCOUNTER SYMPTOMS
DIARRHEA: 0
COUGH: 0
CHEST TIGHTNESS: 0
CONSTIPATION: 0
ABDOMINAL PAIN: 0
NAUSEA: 0
SHORTNESS OF BREATH: 0
VOMITING: 0
WHEEZING: 0
COLOR CHANGE: 0

## 2025-08-01 NOTE — PROGRESS NOTES
Mignon Alves (: 1995) is a 30 y.o. female, Established patient, who presents today for:    Chief Complaint   Patient presents with    Vaginal Bleeding     X1 month, no cramps or pain,          Subjective     HPI:  History of Present Illness  The patient is a 30-year-old female who presents today to discuss prolonged vaginal bleeding. She has been bleeding vaginally for 1 month and reports no cramps or pelvic pain.    She reports experiencing prolonged vaginal bleeding, which started with spotting around 2025, followed by her regular period on 2025. The bleeding has been inconsistent, with periods of light bleeding followed by heavier flow. She had a brief respite from the bleeding for two days before it resumed. Her menstrual cycles have been regular since the birth of her twins three years ago. She is not currently using any form of birth control and is sexually active. She underwent a tubal ligation procedure three years ago following a . She does not recall the date of her last Pap smear or pelvic exam and does not regularly see a gynecologist. She reports no vaginal discharge, odor, rashes, or lesions. She experienced itching two days ago, but this has since resolved. She reports no back pain, constipation, or diarrhea.    Sexual Practices: Sexually active    GYNECOLOGICAL HISTORY:  - Last Menstrual Period: 2025  - Frequency and Flow: Inconsistent, prolonged bleeding for 1 month    PAST SURGICAL HISTORY:  -  with tubal ligation (2022)    FAMILY HISTORY  Her mother had a hysterectomy due to tumors in her uterus.  She has a family history of cervical cancer.            Results         Review of Systems   Constitutional:  Negative for fatigue and unexpected weight change.   Respiratory:  Negative for cough, chest tightness, shortness of breath and wheezing.    Cardiovascular:  Negative for chest pain and palpitations.   Gastrointestinal:  Negative for

## 2025-08-06 ENCOUNTER — HOSPITAL ENCOUNTER (OUTPATIENT)
Age: 30
Setting detail: SPECIMEN
Discharge: HOME | End: 2025-08-06

## 2025-08-06 ENCOUNTER — OFFICE VISIT (OUTPATIENT)
Dept: FAMILY MEDICINE CLINIC | Age: 30
End: 2025-08-06
Payer: COMMERCIAL

## 2025-08-06 VITALS
HEART RATE: 75 BPM | DIASTOLIC BLOOD PRESSURE: 60 MMHG | BODY MASS INDEX: 19.22 KG/M2 | TEMPERATURE: 98.5 F | SYSTOLIC BLOOD PRESSURE: 106 MMHG | WEIGHT: 112 LBS | OXYGEN SATURATION: 99 %

## 2025-08-06 DIAGNOSIS — Z01.419 GYNECOLOGIC EXAM NORMAL: Primary | ICD-10-CM

## 2025-08-06 DIAGNOSIS — N89.8 VAGINAL DISCHARGE: ICD-10-CM

## 2025-08-06 DIAGNOSIS — N92.0 MENORRHAGIA WITH REGULAR CYCLE: ICD-10-CM

## 2025-08-06 PROCEDURE — 99395 PREV VISIT EST AGE 18-39: CPT | Performed by: NURSE PRACTITIONER

## 2025-08-06 ASSESSMENT — ENCOUNTER SYMPTOMS
CHEST TIGHTNESS: 0
COLOR CHANGE: 0
COUGH: 0
WHEEZING: 0
DIARRHEA: 0
SHORTNESS OF BREATH: 0
NAUSEA: 0
CONSTIPATION: 0
VOMITING: 0
ABDOMINAL PAIN: 0

## 2025-08-07 ENCOUNTER — HOSPITAL ENCOUNTER (OUTPATIENT)
Dept: ULTRASOUND IMAGING | Age: 30
Discharge: HOME | End: 2025-08-09
Payer: COMMERCIAL

## 2025-08-07 DIAGNOSIS — Z01.419 GYNECOLOGIC EXAM NORMAL: ICD-10-CM

## 2025-08-07 DIAGNOSIS — N92.0 MENORRHAGIA WITH REGULAR CYCLE: ICD-10-CM

## 2025-08-07 DIAGNOSIS — N89.8 VAGINAL DISCHARGE: ICD-10-CM

## 2025-08-07 PROCEDURE — 93975 VASCULAR STUDY: CPT

## 2025-08-07 PROCEDURE — 76856 US EXAM PELVIC COMPLETE: CPT

## 2025-08-07 PROCEDURE — 76830 TRANSVAGINAL US NON-OB: CPT

## 2025-08-10 LAB
HPV HR 12 DNA SPEC QL NAA+PROBE: NOT DETECTED
HPV16 DNA SPEC QL NAA+PROBE: NOT DETECTED
HPV16+18+H RISK 12 DNA SPEC-IMP: NORMAL
HPV18 DNA SPEC QL NAA+PROBE: NOT DETECTED

## 2025-08-11 ENCOUNTER — OFFICE VISIT (OUTPATIENT)
Dept: FAMILY MEDICINE CLINIC | Age: 30
End: 2025-08-11
Payer: COMMERCIAL

## 2025-08-11 VITALS
WEIGHT: 114.6 LBS | TEMPERATURE: 98 F | OXYGEN SATURATION: 99 % | HEART RATE: 90 BPM | BODY MASS INDEX: 19.66 KG/M2 | DIASTOLIC BLOOD PRESSURE: 60 MMHG | SYSTOLIC BLOOD PRESSURE: 102 MMHG

## 2025-08-11 DIAGNOSIS — N80.03 ADENOMYOSIS: ICD-10-CM

## 2025-08-11 DIAGNOSIS — N92.0 MENORRHAGIA WITH REGULAR CYCLE: Primary | ICD-10-CM

## 2025-08-11 DIAGNOSIS — N83.201 CYST OF RIGHT OVARY: ICD-10-CM

## 2025-08-11 LAB
C TRACH DNA CVX QL NAA+PROBE: NEGATIVE
N GONORRHOEA DNA SPEC QL NAA+PROBE: NEGATIVE

## 2025-08-11 PROCEDURE — 1036F TOBACCO NON-USER: CPT | Performed by: NURSE PRACTITIONER

## 2025-08-11 PROCEDURE — G8420 CALC BMI NORM PARAMETERS: HCPCS | Performed by: NURSE PRACTITIONER

## 2025-08-11 PROCEDURE — 99213 OFFICE O/P EST LOW 20 MIN: CPT | Performed by: NURSE PRACTITIONER

## 2025-08-11 PROCEDURE — G8427 DOCREV CUR MEDS BY ELIG CLIN: HCPCS | Performed by: NURSE PRACTITIONER

## 2025-08-11 ASSESSMENT — ENCOUNTER SYMPTOMS
WHEEZING: 0
DIARRHEA: 0
COLOR CHANGE: 0
VOMITING: 0
ABDOMINAL PAIN: 0
NAUSEA: 0
CONSTIPATION: 0
SHORTNESS OF BREATH: 0

## 2025-08-13 DIAGNOSIS — A59.9 TRICHOMONAS INFECTION: Primary | ICD-10-CM

## 2025-08-13 RX ORDER — METRONIDAZOLE 500 MG/1
500 TABLET ORAL 2 TIMES DAILY
Qty: 14 TABLET | Refills: 0 | Status: SHIPPED | OUTPATIENT
Start: 2025-08-13 | End: 2025-08-20